# Patient Record
Sex: MALE | Race: BLACK OR AFRICAN AMERICAN | Employment: UNEMPLOYED | URBAN - METROPOLITAN AREA
[De-identification: names, ages, dates, MRNs, and addresses within clinical notes are randomized per-mention and may not be internally consistent; named-entity substitution may affect disease eponyms.]

---

## 2017-01-03 RX ORDER — AMLODIPINE BESYLATE 10 MG/1
TABLET ORAL
Qty: 30 TAB | Refills: 5 | Status: SHIPPED | OUTPATIENT
Start: 2017-01-03 | End: 2017-02-22

## 2017-01-27 DIAGNOSIS — E78.00 HYPERCHOLESTEROLEMIA: ICD-10-CM

## 2017-01-27 RX ORDER — ATORVASTATIN CALCIUM 20 MG/1
TABLET, FILM COATED ORAL
Qty: 30 TAB | Refills: 5 | Status: SHIPPED | OUTPATIENT
Start: 2017-01-27 | End: 2017-02-22 | Stop reason: SDUPTHER

## 2017-01-29 DIAGNOSIS — E11.9 DIABETES MELLITUS WITHOUT COMPLICATION (HCC): ICD-10-CM

## 2017-02-03 RX ORDER — GLIPIZIDE 5 MG/1
TABLET ORAL
Qty: 30 TAB | Refills: 5 | Status: SHIPPED | OUTPATIENT
Start: 2017-02-03 | End: 2017-08-01 | Stop reason: SDUPTHER

## 2017-02-14 ENCOUNTER — HOSPITAL ENCOUNTER (OUTPATIENT)
Dept: LAB | Age: 37
Discharge: HOME | End: 2017-02-14

## 2017-02-14 PROCEDURE — 99001 SPECIMEN HANDLING PT-LAB: CPT | Performed by: FAMILY MEDICINE

## 2017-02-22 ENCOUNTER — OFFICE VISIT (OUTPATIENT)
Dept: FAMILY MEDICINE CLINIC | Age: 37
End: 2017-02-22

## 2017-02-22 VITALS
WEIGHT: 241 LBS | OXYGEN SATURATION: 98 % | HEART RATE: 72 BPM | DIASTOLIC BLOOD PRESSURE: 68 MMHG | BODY MASS INDEX: 32.64 KG/M2 | SYSTOLIC BLOOD PRESSURE: 120 MMHG | TEMPERATURE: 98.5 F | RESPIRATION RATE: 16 BRPM | HEIGHT: 72 IN

## 2017-02-22 DIAGNOSIS — E11.9 DIABETES MELLITUS WITHOUT COMPLICATION (HCC): ICD-10-CM

## 2017-02-22 DIAGNOSIS — I10 ESSENTIAL HYPERTENSION: ICD-10-CM

## 2017-02-22 DIAGNOSIS — R35.0 URINARY FREQUENCY: ICD-10-CM

## 2017-02-22 DIAGNOSIS — E11.9 DIABETES MELLITUS WITHOUT COMPLICATION (HCC): Primary | ICD-10-CM

## 2017-02-22 DIAGNOSIS — E78.00 HYPERCHOLESTEROLEMIA: ICD-10-CM

## 2017-02-22 DIAGNOSIS — M79.89 RIGHT LEG SWELLING: ICD-10-CM

## 2017-02-22 DIAGNOSIS — F20.0 CHRONIC PARANOID SCHIZOPHRENIA (HCC): ICD-10-CM

## 2017-02-22 DIAGNOSIS — K76.0 FATTY LIVER: ICD-10-CM

## 2017-02-22 DIAGNOSIS — N18.2 CHRONIC KIDNEY DISEASE (CKD), STAGE II (MILD): ICD-10-CM

## 2017-02-22 LAB
BILIRUB UR QL STRIP: NEGATIVE
GLUCOSE UR-MCNC: NEGATIVE MG/DL
KETONES P FAST UR STRIP-MCNC: NORMAL MG/DL
PH UR STRIP: 7 [PH] (ref 4.6–8)
PROT UR QL STRIP: NEGATIVE MG/DL
SP GR UR STRIP: 1.01 (ref 1–1.03)
UA UROBILINOGEN AMB POC: NORMAL (ref 0.2–1)
URINALYSIS CLARITY POC: CLEAR
URINALYSIS COLOR POC: YELLOW
URINE BLOOD POC: NEGATIVE
URINE LEUKOCYTES POC: NEGATIVE
URINE NITRITES POC: NEGATIVE

## 2017-02-22 RX ORDER — ATORVASTATIN CALCIUM 20 MG/1
TABLET, FILM COATED ORAL
Qty: 30 TAB | Refills: 5 | Status: SHIPPED | OUTPATIENT
Start: 2017-02-22 | End: 2017-08-28 | Stop reason: SDUPTHER

## 2017-02-22 RX ORDER — LOSARTAN POTASSIUM 100 MG/1
100 TABLET ORAL DAILY
Qty: 30 TAB | Refills: 5 | Status: SHIPPED | OUTPATIENT
Start: 2017-02-22 | End: 2017-08-28 | Stop reason: SDUPTHER

## 2017-02-22 NOTE — MR AVS SNAPSHOT
Visit Information Date & Time Provider Department Dept. Phone Encounter #  
 2/22/2017  6:30 PM Jose Galeas, [address] [id number] Follow-up Instructions Return in about 1 week (around 3/1/2017) for leg swelling. Routine f/u due early Aug with labs due prior . Your Appointments 2/22/2017  6:30 PM  
ROUTINE CARE with Jose Galeas MD  
[address] [facility] ([facility]) Appt Note: routine f/u 6mo fasting labs; .; .; -; -; .  
 [address]  
[address] [facility] [address] Upcoming Health Maintenance Date Due Pneumococcal 19-64 Highest Risk (1 of 3 - PCV13) 6/28/1997 EYE EXAM RETINAL OR DILATED Q1 2/5/2016 HEMOGLOBIN A1C Q6M 8/14/2017 MICROALBUMIN Q1 2/14/2018 LIPID PANEL Q1 2/14/2018 FOOT EXAM Q1 2/22/2018 DTaP/Tdap/Td series (2 - Td) 3/16/2025 Allergies as of 2/22/2017  Review Complete On: 2/22/2017 By: Jose Galeas MD  
 No Known Allergies Current Immunizations  Reviewed on 11/30/2015 Name Date Influenza Vaccine 12/28/2014 Influenza Vaccine (Quad) PF 10/13/2016, 11/4/2015 Tdap 3/16/2015 Not reviewed this visit You Were Diagnosed With   
  
 Codes Comments Diabetes mellitus without [facility] ([facility]    -  Primary ICD-10-CM: E11.9 ICD-9-CM: 250.00 Hypercholesterolemia     ICD-10-CM: E78.00 ICD-9-CM: 272.0 Chronic paranoid schizophrenia ([facility]     ICD-10-CM: F20.0 ICD-9-CM: 295.32 Chronic kidney disease (CKD), stage II (mild)     ICD-10-CM: N18.2 ICD-9-CM: 154. 2 Fatty liver     ICD-10-CM: K76.0 ICD-9-CM: 571.8 Urinary frequency     ICD-10-CM: R35.0 ICD-9-CM: 788.41 Right leg swelling     ICD-10-CM: M79.89 ICD-9-CM: 729.81 Vitals BP  
  
  
  
  
  
 120/68 (BP 1 Location: Left arm, BP Patient Position: Sitting) Vitals History BMI and BSA Data Body Mass Index Body Surface Area  
 32.69 kg/m 2 2.36 m 2 Preferred Pharmacy Pharmacy Name Phone [facility] [facility], [address] Your Updated Medication List  
  
   
This list is accurate as of: 2/22/17  4:50 PM.  Always use your most recent med list.  
  
  
  
  
 aspirin 81 mg chewable tablet Take 81 mg by mouth daily. atorvastatin 20 mg tablet Commonly known as:  LIPITOR  
take 1 tablet by mouth once daily  
  
 benztropine 2 mg tablet Commonly known as:  COGENTIN Take 4 mg by mouth two (2) times a day. divalproex  mg tablet Commonly known as:  DEPAKOTE  
1 tab qam and 2 tabs qhs  
  
 glipiZIDE 5 mg tablet Commonly known as:  GLUCOTROL  
take 1/2 tablet by mouth twice a day  
  
 losartan 100 mg tablet Commonly known as:  COZAAR Take 1 Tab by mouth daily. traZODone 100 mg tablet Commonly known as:  Ahmadi Keo Take 100 mg by mouth nightly. Prescriptions Printed Refills  
 atorvastatin (LIPITOR) 20 mg tablet 5 Sig: take 1 tablet by mouth once daily Class: Print Prescriptions Sent to Pharmacy Refills  
 losartan (COZAAR) 100 mg tablet 5 Sig: Take 1 Tab by mouth daily. Class: Normal  
 Pharmacy: [id number], [address] [facility] Ph #: [phone] Route: Oral  
  
We Performed the Following AMB POC URINALYSIS DIP STICK AUTO W/O MICRO [28124 CPT(R)] Follow-up Instructions Return in about 1 week (around 3/1/2017) for leg swelling. Routine f/u due early Aug with labs due prior . To-Do List   
 02/22/2017 Imaging:  DUPLEX LOWER EXT VENOUS RIGHT   
  
 02/22/2017 Lab:  HEMOGLOBIN A1C W/O EAG   
  
 02/22/2017 Lab:  LIPID PANEL Patient Instructions Learning About Diabetes Food Guidelines Your Care Instructions Meal planning is important to manage diabetes.  It helps keep your blood sugar at a target level (which you set with your doctor). You don't have to eat special foods. You can eat what your family eats, including sweets once in a while. But you do have to pay attention to how often you eat and how much you eat of certain foods. You may want to work with a dietitian or a certified diabetes educator (CDE) to help you plan meals and snacks. A dietitian or CDE can also help you lose weight if that is one of your goals. What should you know about eating carbs? Managing the amount of carbohydrate (carbs) you eat is an important part of healthy meals when you have diabetes. Carbohydrate is found in many foods. · Learn which foods have carbs. And learn the amounts of carbs in different foods. ¨ Bread, cereal, pasta, and rice have about 15 grams of carbs in a serving. A serving is 1 slice of bread (1 ounce), ½ cup of cooked cereal, or 1/3 cup of cooked pasta or rice. ¨ Fruits have 15 grams of carbs in a serving. A serving is 1 small fresh fruit, such as an apple or orange; ½ of a banana; ½ cup of cooked or canned fruit; ½ cup of fruit juice; 1 cup of melon or raspberries; or 2 tablespoons of dried fruit. ¨ Milk and no-sugar-added yogurt have 15 grams of carbs in a serving. A serving is 1 cup of milk or 2/3 cup of no-sugar-added yogurt. ¨ Starchy vegetables have 15 grams of carbs in a serving. A serving is ½ cup of mashed potatoes or sweet potato; 1 cup winter squash; ½ of a small baked potato; ½ cup of cooked beans; or ½ cup cooked corn or green peas. · Learn how much carbs to eat each day and at each meal. A dietitian or CDE can teach you how to keep track of the amount of carbs you eat. This is called carbohydrate counting. · If you are not sure how to count carbohydrate grams, use the Plate Method to plan meals. It is a good, quick way to make sure that you have a balanced meal. It also helps you spread carbs throughout the day. ¨ Divide your plate by types of foods. Put non-starchy vegetables on half the plate, meat or other protein food on one-quarter of the plate, and a grain or starchy vegetable in the final quarter of the plate. To this you can add a small piece of fruit and 1 cup of milk or yogurt, depending on how many carbs you are supposed to eat at a meal. 
· Try to eat about the same amount of carbs at each meal. Do not \"save up\" your daily allowance of carbs to eat at one meal. 
· Proteins have very little or no carbs per serving. Examples of proteins are beef, chicken, turkey, fish, eggs, tofu, cheese, cottage cheese, and peanut butter. A serving size of meat is 3 ounces, which is about the size of a deck of cards. Examples of meat substitute serving sizes (equal to 1 ounce of meat) are 1/4 cup of cottage cheese, 1 egg, 1 tablespoon of peanut butter, and ½ cup of tofu. How can you eat out and still eat healthy? · Learn to estimate the serving sizes of foods that have carbohydrate. If you measure food at home, it will be easier to estimate the amount in a serving of restaurant food. · If the meal you order has too much carbohydrate (such as potatoes, corn, or baked beans), ask to have a low-carbohydrate food instead. Ask for a salad or green vegetables. · If you use insulin, check your blood sugar before and after eating out to help you plan how much to eat in the future. · If you eat more carbohydrate at a meal than you had planned, take a walk or do other exercise. This will help lower your blood sugar. What else should you know? · Limit saturated fat, such as the fat from meat and dairy products. This is a healthy choice because people who have diabetes are at higher risk of heart disease. So choose lean cuts of meat and nonfat or low-fat dairy products. Use olive or canola oil instead of butter or shortening when cooking. · Don't skip meals. Your blood sugar may drop too low if you skip meals and take insulin or certain medicines for diabetes.  
· Check with your doctor before you drink alcohol. Alcohol can cause your blood sugar to drop too low. Alcohol can also cause a bad reaction if you take certain diabetes medicines. Follow-up care is a key part of your treatment and safety. Be sure to make and go to all appointments, and call your doctor if you are having problems. It's also a good idea to know your test results and keep a list of the medicines you take. Where can you learn more? Go to http://tammie-angelica.info/. Enter F733 in the search box to learn more about \"Learning About Diabetes Food Guidelines. \" Current as of: May 23, 2016 Content Version: 11.1 © 3483-3138 [employer]. Care instructions adapted under license by [employer] (which disclaims liability or warranty for this information). If you have questions about a medical condition or this instruction, always ask your healthcare professional. [address] any warranty or liability for your use of this information. Introducing [facility]! [facility] introduces Bluelock patient portal. Now you can access parts of your medical record, email your doctor's office, and request medication refills online. 1. In your internet browser, go to [email or web address] 2. Click on the First Time User? Click Here link in the Sign In box. You will see the New Member Sign Up page. 3. Enter your Bluelock Access Code exactly as it appears below. You will not need to use this code after youve completed the sign-up process. If you do not sign up before the expiration date, you must request a new code. · Bluelock Access Code: [email or web address] Expires: 5/15/2017  9:36 AM 
 
4. Enter the last four digits of your Social Security Number (xxxx) and Date of Birth (mm/dd/yyyy) as indicated and click Submit. You will be taken to the next sign-up page. 5. Create a Bluelock ID.  This will be your Bluelock login ID and cannot be changed, so think of one that is secure and easy to remember. 6. Create a [employer] password. You can change your password at any time. 7. Enter your Password Reset Question and Answer. This can be used at a later time if you forget your password. 8. Enter your e-mail address. You will receive e-mail notification when new information is available in [address] 9. Click Sign Up. You can now view and download portions of your medical record. 10. Click the Download Summary menu link to download a portable copy of your medical information. If you have questions, please visit the Frequently Asked Questions section of the [employer] website. Remember, [employer] is NOT to be used for urgent needs. For medical emergencies, dial 911. Now available from your iPhone and Android! Please provide this summary of care documentation to your next provider. Your primary care clinician is listed as Sultana Suárez. If you have any questions after today's visit, please call [phone].

## 2017-02-22 NOTE — PATIENT INSTRUCTIONS

## 2017-02-22 NOTE — PROGRESS NOTES
1. Have you been to the ER, urgent care clinic since your last visit? Hospitalized since your last visit? No    2. Have you seen or consulted any other health care providers outside of the [address] [place] [address] since your last visit? Include any pap smears or colon screening. No     Annual eye exam: 2015 aware that he needs to have DM Eye Exam  Pneumococcal vaccine: N/A  Flu vaccine: 10-  Patient instructed to remove shoes: Yes    Mini Winter is presented at today's visit with female  by the name of Pete Fletcher. Mini Winter has given verbal permission for her to sit in routine visit.

## 2017-02-23 ENCOUNTER — HOSPITAL ENCOUNTER (OUTPATIENT)
Dept: VASCULAR SURGERY | Age: 37
Discharge: HOME | End: 2017-02-23
Attending: FAMILY MEDICINE
Payer: MEDICAID

## 2017-02-23 DIAGNOSIS — M79.89 RIGHT LEG SWELLING: ICD-10-CM

## 2017-02-23 DIAGNOSIS — I82.511 CHRONIC DEEP VEIN THROMBOSIS (DVT) OF FEMORAL VEIN OF RIGHT LOWER EXTREMITY (HCC): Primary | ICD-10-CM

## 2017-02-23 PROCEDURE — 93971 EXTREMITY STUDY: CPT

## 2017-02-23 NOTE — PROCEDURES
[facility]  *** FINAL REPORT ***    Name: Zaynab Gorman  MRN: [id number]    Outpatient  : 1980  HIS Order #: [id number]  [address] [facility] Visit #: [id number]  Date: 2017    TYPE OF TEST: Peripheral Venous Testing    REASON FOR TEST  Limb swelling    Right Leg:-  Deep venous thrombosis:           Yes  Proximal extent of thrombus:      Common Femoral  Superficial venous thrombosis:    No  Deep venous insufficiency:        Not examined  Superficial venous insufficiency: Not examined      INTERPRETATION/FINDINGS  Duplex images were obtained using 2-D gray scale, color flow, and  spectral Doppler analysis. Right leg :  1. Chronic remnant nonocclusive deep venous thrombosis identified in  the common femoral and proximal femoral veins. 2. Deep vein(s) visualized include the common femoral, proximal  femoral, mid femoral, distal femoral, popliteal(above knee),  popliteal(fossa), popliteal(below knee), posterior tibial and peroneal   veins. 3. No evidence of deep vein thrombosis in the contralateral common  femoral vein. 4. Superficial vein(s) visualized include the great saphenous vein. 5. No evidence of superficial thrombosis detected. ADDITIONAL COMMENTS  I called Dr Thurston Snare office and notified Tisam of the finding @ 9:31 am.    I have personally reviewed the data relevant to the interpretation of  this  study. TECHNOLOGIST: Prosper Quiroga, [facility], RVT/  Signed: 2017 09:31 AM    PHYSICIAN: Fred Monterroso MD  Signed: 2017 05:57 PM

## 2017-02-23 NOTE — PROGRESS NOTES
D/W patient - sent in Compression stockings to [address] [place] [address]. Discussed that the best initial treatment is to wear compression, avoid prolonged immobilization, daily ASA. I will see him for follow-up and continue to reinforce these concepts as he has forgotten all of this from his prior vascular visit a few years ago.

## 2017-02-23 NOTE — PROGRESS NOTES
SUBJECTIVE  Chief Complaint   Patient presents with    Cholesterol Problem    Diabetes    Hypertension    Urinary Frequency     Patient presents for follow-up on their diabetes and high cholesterol. He is currently not on Lipitor. His labs show an increased LDL. He has a history of fatty liver. He has no myalgias or cramping. He has been taking glipizide twice daily without any signs or symptoms of hypoglycemia. We also reviewed their cardiac risk factors. The patient is not checking home blood sugars. Denies any blurred vision. Denies any polyuria, polydipsia, or polyphagia. Denies any weight loss. He has had right leg swelling for 2 weeks now. He has a history of this in early 2016 and was seen by the ER and vascular surgery. He says he has not had pain. He had a chronic DVT at that time and was not placed on any coumadin. The note by vascular is in the chart and reviewed. He has no chest pain or dyspnea. No pleuritic pains noted. ROS:  History obtained from the patient  · General: negative for - chills, fever, weight changes or malaise  · Respiratory: no cough, shortness of breath, or wheezing  · Neurological: no tingling, headache or dizziness. · : polyuria for 2 weeks. Says he is eating more sweets. OBJECTIVE  Blood pressure 120/68, pulse 72, temperature 98.5 °F (36.9 °C), temperature source Oral, resp. rate 16, height 6' (1.829 m), weight 241 lb (109.3 kg), SpO2 98 %. General:  alert, cooperative, well appearing, in no apparent distress. CV:  The heart sounds are regular in rate and rhythm. There is a normal S1 and S2. There or no murmurs. Lungs: Inspiratory and expiratory efforts are full and unlabored. Lung sounds are clear and equal to auscultation throughout all lung fields without wheezing, rales, or rhonchi. GI:  The abdomen is soft and nontender. There is no hepatosplenomegaly or other masses. There is no rebound or guarding. Extremities:   There is swelling of the right leg and foot that is pitting. No color changes. There is dry skin but no breakdown. The feet are without lesions or ulcerations. Psych: normal affect. Mood good. Oriented x 3. Judgement and insight intact. Results for orders placed or performed in visit on 02/22/17   AMB POC URINALYSIS DIP STICK AUTO W/O MICRO   Result Value Ref Range    Color (UA POC) Yellow     Clarity (UA POC) Clear     Glucose (UA POC) Negative Negative    Bilirubin (UA POC) Negative Negative    Ketones (UA POC) Trace Negative    Specific gravity (UA POC) 1.015 1.001 - 1.035    Blood (UA POC) Negative Negative    pH (UA POC) 7.0 4.6 - 8.0    Protein (UA POC) Negative Negative mg/dL    Urobilinogen (UA POC) 1 mg/dL 0.2 - 1    Nitrites (UA POC) Negative Negative    Leukocyte esterase (UA POC) Negative Negative     ASSESSMENT / PLAN    ICD-10-CM ICD-9-CM    1. Diabetes mellitus without complication (HCC) L90.3 250.00 HEMOGLOBIN A1C W/O EAG   2. Hypercholesterolemia E78.00 272.0 atorvastatin (LIPITOR) 20 mg tablet      LIPID PANEL   3. Chronic paranoid schizophrenia (HCC) F20.0 295.32    4. Chronic kidney disease (CKD), stage II (mild) N18.2 585.2    5. Fatty liver K76.0 571.8    6. Urinary frequency R35.0 788.41 AMB POC URINALYSIS DIP STICK AUTO W/O MICRO   7. Right leg swelling M79.89 729.81 DUPLEX LOWER EXT VENOUS RIGHT      CANCELED: DUPLEX LOWER EXT VENOUS RIGHT     Reviewed labs - no hepatotoxicity. Diabetes -   Continue current care. Refills as needed. He will check his feet daily and is reminded on annual eye exams. Hypercholesterolemia - uncontrolled. Restart Lipitor 20mg nightly. Schizophrenia - continue follow-up with psychiatry. He says he is doing well without any harmful ideation. HTN with CKD - renal function is normal.  HTN is well controlled. I have changed him from Norvasc to losartan due to his right leg swelling. Fatty liver - avoid alcohol. Diet and exercise.     Urinary frequency- U/A essentially normal.  Advised on cutting back on sweets. Right leg swelling - reviewed vascular notes. I have ordered a venous doppler to re-evaluate this chronic issue with DVT. I will see him back in 1 week. He may have to see vascular again or have a hematology evaluation. He is advised on compression stockings. All chart history elements were reviewed by me at the time of the visit even though marked at time of note closure. Patient understands our medical plan. Patient has provided input and agrees with goals. Alternatives have been explained and offered. All questions answered. The patient is to call if condition worsens or fails to improve. Follow-up Disposition:  Return in about 1 week (around 3/1/2017) for leg swelling. Routine f/u due early Aug with labs due prior .

## 2017-07-10 RX ORDER — AMLODIPINE BESYLATE 10 MG/1
TABLET ORAL
Qty: 30 TAB | Refills: 5 | OUTPATIENT
Start: 2017-07-10

## 2017-07-10 NOTE — TELEPHONE ENCOUNTER
Spoke with Virginia at [employer]. He states that Amlodipine was last refilled on 6/6/17 for #30. Medication discontinued at pharmacy.

## 2017-07-24 ENCOUNTER — HOSPITAL ENCOUNTER (OUTPATIENT)
Dept: LAB | Age: 37
Discharge: HOME | End: 2017-07-24

## 2017-07-24 PROCEDURE — 99001 SPECIMEN HANDLING PT-LAB: CPT | Performed by: FAMILY MEDICINE

## 2017-07-25 LAB
CHOLEST SERPL-MCNC: 178 MG/DL (ref 100–199)
HBA1C MFR BLD: 6.2 % (ref 4.8–5.6)
HDLC SERPL-MCNC: 53 MG/DL
INTERPRETATION, 910389: NORMAL
LDLC SERPL CALC-MCNC: 97 MG/DL (ref 0–99)
TRIGL SERPL-MCNC: 139 MG/DL (ref 0–149)
VLDLC SERPL CALC-MCNC: 28 MG/DL (ref 5–40)

## 2017-08-01 DIAGNOSIS — E11.9 DIABETES MELLITUS WITHOUT COMPLICATION (HCC): ICD-10-CM

## 2017-08-01 RX ORDER — GLIPIZIDE 5 MG/1
TABLET ORAL
Qty: 30 TAB | Refills: 5 | Status: SHIPPED | OUTPATIENT
Start: 2017-08-01 | End: 2018-03-15 | Stop reason: SDUPTHER

## 2017-08-28 ENCOUNTER — OFFICE VISIT (OUTPATIENT)
Dept: FAMILY MEDICINE CLINIC | Age: 37
End: 2017-08-28

## 2017-08-28 VITALS
WEIGHT: 238 LBS | SYSTOLIC BLOOD PRESSURE: 120 MMHG | OXYGEN SATURATION: 98 % | BODY MASS INDEX: 32.23 KG/M2 | HEIGHT: 72 IN | DIASTOLIC BLOOD PRESSURE: 82 MMHG | TEMPERATURE: 98.7 F | RESPIRATION RATE: 16 BRPM | HEART RATE: 95 BPM

## 2017-08-28 DIAGNOSIS — F20.0 CHRONIC PARANOID SCHIZOPHRENIA (HCC): ICD-10-CM

## 2017-08-28 DIAGNOSIS — E78.00 HYPERCHOLESTEROLEMIA: ICD-10-CM

## 2017-08-28 DIAGNOSIS — K76.0 FATTY LIVER: ICD-10-CM

## 2017-08-28 DIAGNOSIS — E11.9 DIABETES MELLITUS WITHOUT COMPLICATION (HCC): ICD-10-CM

## 2017-08-28 DIAGNOSIS — E11.9 DIABETES MELLITUS WITHOUT COMPLICATION (HCC): Primary | ICD-10-CM

## 2017-08-28 DIAGNOSIS — M79.89 LEG SWELLING: ICD-10-CM

## 2017-08-28 DIAGNOSIS — N18.2 CHRONIC KIDNEY DISEASE (CKD), STAGE II (MILD): ICD-10-CM

## 2017-08-28 DIAGNOSIS — E66.9 OBESITY, UNSPECIFIED OBESITY SEVERITY, UNSPECIFIED OBESITY TYPE: ICD-10-CM

## 2017-08-28 DIAGNOSIS — I10 ESSENTIAL HYPERTENSION: ICD-10-CM

## 2017-08-28 RX ORDER — LOSARTAN POTASSIUM 100 MG/1
100 TABLET ORAL DAILY
Qty: 30 TAB | Refills: 5 | Status: SHIPPED | OUTPATIENT
Start: 2017-08-28 | End: 2018-03-15 | Stop reason: SDUPTHER

## 2017-08-28 RX ORDER — ATORVASTATIN CALCIUM 20 MG/1
TABLET, FILM COATED ORAL
Qty: 30 TAB | Refills: 5 | Status: SHIPPED | OUTPATIENT
Start: 2017-08-28 | End: 2018-03-15 | Stop reason: SDUPTHER

## 2017-08-28 NOTE — PATIENT INSTRUCTIONS
[facility]  Address: [address], [place], [address]  Phone: [phone]  Appointment: 9/6/17 @ 10:15 am with Dr. Fern Sullivan: Care Instructions  Your Care Instructions  A healthy lifestyle can help you feel good, stay at a healthy weight, and have plenty of energy for both work and play. A healthy lifestyle is something you can share with your whole family. A healthy lifestyle also can lower your risk for serious health problems, such as high blood pressure, heart disease, and diabetes. You can follow a few steps listed below to improve your health and the health of your family. Follow-up care is a key part of your treatment and safety. Be sure to make and go to all appointments, and call your doctor if you are having problems. Its also a good idea to know your test results and keep a list of the medicines you take. How can you care for yourself at home? · Do not eat too much sugar, fat, or fast foods. You can still have dessert and treats now and then. The goal is moderation. · Start small to improve your eating habits. Pay attention to portion sizes, drink less juice and soda pop, and eat more fruits and vegetables. ¨ Eat a healthy amount of food. A 3-ounce serving of meat, for example, is about the size of a deck of cards. Fill the rest of your plate with vegetables and whole grains. ¨ Limit the amount of soda and sports drinks you have every day. Drink more water when you are thirsty. ¨ Eat at least 5 servings of fruits and vegetables every day. It may seem like a lot, but it is not hard to reach this goal. A serving or helping is 1 piece of fruit, 1 cup of vegetables, or 2 cups of leafy, raw vegetables. Have an apple or some carrot sticks as an afternoon snack instead of a candy bar. Try to have fruits and/or vegetables at every meal.  · Make exercise part of your daily routine. You may want to start with simple activities, such as walking, bicycling, or slow swimming.  Try to be active 30 to 60 minutes every day. You do not need to do all 30 to 60 minutes all at once. For example, you can exercise 3 times a day for 10 or 20 minutes. Moderate exercise is safe for most people, but it is always a good idea to talk to your doctor before starting an exercise program.  · Keep moving. Inell Nathaniel the lawn, work in the garden, or uchoose. Take the stairs instead of the elevator at work. · If you smoke, quit. People who smoke have an increased risk for heart attack, stroke, cancer, and other lung illnesses. Quitting is hard, but there are ways to boost your chance of quitting tobacco for good. ¨ Use nicotine gum, patches, or lozenges. ¨ Ask your doctor about stop-smoking programs and medicines. ¨ Keep trying. In addition to reducing your risk of diseases in the future, you will notice some benefits soon after you stop using tobacco. If you have shortness of breath or asthma symptoms, they will likely get better within a few weeks after you quit. · Limit how much alcohol you drink. Moderate amounts of alcohol (up to 2 drinks a day for men, 1 drink a day for women) are okay. But drinking too much can lead to liver problems, high blood pressure, and other health problems. Family health  If you have a family, there are many things you can do together to improve your health. · Eat meals together as a family as often as possible. · Eat healthy foods. This includes fruits, vegetables, lean meats and dairy, and whole grains. · Include your family in your fitness plan. Most people think of activities such as jogging or tennis as the way to fitness, but there are many ways you and your family can be more active. Anything that makes you breathe hard and gets your heart pumping is exercise. Here are some tips:  ¨ Walk to do errands or to take your child to school or the bus. ¨ Go for a family bike ride after dinner instead of watching TV. Where can you learn more?   Go to http://tammie-angelica.info/. Enter N354 in the search box to learn more about \"A Healthy Lifestyle: Care Instructions. \"  Current as of: July 26, 2016  Content Version: 11.3  © 3279-0272 [employer]. Care instructions adapted under license by [employer] (which disclaims liability or warranty for this information). If you have questions about a medical condition or this instruction, always ask your healthcare professional. [place] [address] any warranty or liability for your use of this information.

## 2017-08-28 NOTE — PROGRESS NOTES
Chief Complaint   Patient presents with    Diabetes    Cholesterol Problem    Fatty Liver    Chronic Kidney Disease    Hypertension     1. Have you been to the ER, urgent care clinic since your last visit? Hospitalized since your last visit? No    2. Have you seen or consulted any other health care providers outside of the [address] [place] [address] since your last visit? Include any pap smears or colon screening.  [facility]    Has not had an eye exam.

## 2017-08-28 NOTE — PROGRESS NOTES
SUBJECTIVE  Chief Complaint   Patient presents with    Diabetes    Cholesterol Problem    Fatty Liver    Chronic Kidney Disease    Hypertension     Patient presents for follow-up on their diabetes and high cholesterol. He is currently back to taking his Lipitor. His labs show that his LDL has gone back down as a result. He has a history of fatty liver. He has no myalgias or cramping. He has been taking glipizide twice daily without any signs or symptoms of hypoglycemia. We also reviewed their cardiac risk factors. The patient is still not checking home blood sugars. Denies any blurred vision. He is overdue for an eye exam but it is scheduled. Denies any polyuria, polydipsia, or polyphagia. Denies any weight loss. ROS:  History obtained from the patient  · General: negative for - chills, fever, weight changes or malaise  · Respiratory: no cough, shortness of breath, or wheezing  · Neurological: no tingling, headache or dizziness. · Cardiovascular:  He says his leg swelling resolved so he is not wearing compression stockings    OBJECTIVE  Blood pressure 120/82, pulse 95, temperature 98.7 °F (37.1 °C), temperature source Oral, resp. rate 16, height 6' (1.829 m), weight 238 lb (108 kg), SpO2 98 %. General:  alert, cooperative, well appearing, in no apparent distress. CV:  The heart sounds are regular in rate and rhythm. There is a normal S1 and S2. There or no murmurs. Lungs: Inspiratory and expiratory efforts are full and unlabored. Lung sounds are clear and equal to auscultation throughout all lung fields without wheezing, rales, or rhonchi. GI:  The abdomen is soft and nontender. There is no hepatosplenomegaly or other masses. There is no rebound or guarding. Extremities:  No swelling. No pedal edema. Psych: normal affect. Mood good. Oriented x 3. Judgement and insight intact.      Results for orders placed or performed in visit on 02/22/17   LIPID PANEL   Result Value Ref Range    Cholesterol, total 178 100 - 199 mg/dL    Triglyceride 139 0 - 149 mg/dL    HDL Cholesterol 53 >39 mg/dL    VLDL, calculated 28 5 - 40 mg/dL    LDL, calculated 97 0 - 99 mg/dL   HEMOGLOBIN A1C W/O EAG   Result Value Ref Range    Hemoglobin A1c 6.2 (H) 4.8 - 5.6 %   CVD REPORT   Result Value Ref Range    INTERPRETATION Note    AMB POC URINALYSIS DIP STICK AUTO W/O MICRO   Result Value Ref Range    Color (UA POC) Yellow     Clarity (UA POC) Clear     Glucose (UA POC) Negative Negative    Bilirubin (UA POC) Negative Negative    Ketones (UA POC) Trace Negative    Specific gravity (UA POC) 1.015 1.001 - 1.035    Blood (UA POC) Negative Negative    pH (UA POC) 7.0 4.6 - 8.0    Protein (UA POC) Negative Negative mg/dL    Urobilinogen (UA POC) 1 mg/dL 0.2 - 1    Nitrites (UA POC) Negative Negative    Leukocyte esterase (UA POC) Negative Negative     ASSESSMENT / PLAN    ICD-10-CM ICD-9-CM    1. Diabetes mellitus without complication (HCC) I64.7 250.00 CBC W/O DIFF      METABOLIC PANEL, COMPREHENSIVE      LIPID PANEL      HEMOGLOBIN A1C W/O EAG      MICROALBUMIN, UR, RAND W/ MICROALBUMIN/CREA RATIO   2. Essential hypertension I10 401.9 losartan (COZAAR) 100 mg tablet      CBC W/O DIFF      METABOLIC PANEL, COMPREHENSIVE      LIPID PANEL   3. Hypercholesterolemia E78.00 272.0 atorvastatin (LIPITOR) 20 mg tablet      CBC W/O DIFF      METABOLIC PANEL, COMPREHENSIVE      LIPID PANEL   4. Leg swelling M79.89 729.81    5. Chronic paranoid schizophrenia (HCC) F20.0 295.32    6. Chronic kidney disease (CKD), stage II (mild) E72.9 130.8 METABOLIC PANEL, COMPREHENSIVE   7. Fatty liver X81.9 910.7 METABOLIC PANEL, COMPREHENSIVE   8. Obesity, unspecified obesity severity, unspecified obesity type E66.9 278.00      Reviewed labs - no hepatotoxicity. Diabetes -   Continue current care. Refills as needed. He will check his feet daily and is reminded on annual eye exams.       HTN with CKD - renal function is normal.  HTN is well controlled. He will continue losartan due to his right leg swelling. Hypercholesterolemia - controlled. Cont Lipitor 20mg nightly. Schizophrenia - continue follow-up with psychiatry. He says he is doing well without any harmful ideation. Right leg swelling - resolved after Norvasc discontinued. Obesity / Fatty liver - avoid alcohol. Diet and exercise. All chart history elements were reviewed by me at the time of the visit even though marked at time of note closure. Patient understands our medical plan. Patient has provided input and agrees with goals. Alternatives have been explained and offered. All questions answered. The patient is to call if condition worsens or fails to improve. Follow-up Disposition:  Return in about 6 months (around 2/28/2018) for routine care. Fasting labs due 3-7 days .

## 2017-08-28 NOTE — MR AVS SNAPSHOT
Visit Information Date & Time Provider Department Dept. Phone Encounter #  
 8/28/2017  8:45 AM Liliana Bartholomew, [address] [id number] Follow-up Instructions Return in about 6 months (around 2/28/2018) for routine care. Fasting labs due 3-7 days . Your Appointments 8/28/2017  8:45 AM  
ROUTINE CARE with Liliana Bartholomew MD  
[facility] ([facility]) Appt Note: 6 MONTH FOLLOW-UP (RESCHEDULED FROM 08/15/2017); $0 CP, $-1.00 BAL, 08/21/2017 [address]  
[address] [facility] [address] [facility] [address] Upcoming Health Maintenance Date Due Pneumococcal 19-64 Medium Risk (1 of 1 - PPSV23) 6/28/1997 EYE EXAM RETINAL OR DILATED Q1 2/5/2016 INFLUENZA AGE 9 TO ADULT 8/1/2017 HEMOGLOBIN A1C Q6M 1/24/2018 MICROALBUMIN Q1 2/14/2018 FOOT EXAM Q1 2/22/2018 LIPID PANEL Q1 7/24/2018 DTaP/Tdap/Td series (2 - Td) 3/16/2025 Allergies as of 8/28/2017  Review Complete On: 2/22/2017 By: Liliana Bartholomew MD  
 No Known Allergies Current Immunizations  Reviewed on 11/30/2015 Name Date Influenza Vaccine 12/28/2014 Influenza Vaccine (Quad) PF 10/13/2016, 11/4/2015 Tdap 3/16/2015 Not reviewed this visit You Were Diagnosed With   
  
 Codes Comments Diabetes mellitus without complication ([facility]    -  Primary ICD-10-CM: E11.9 ICD-9-CM: 250.00 Essential hypertension     ICD-10-CM: I10 
ICD-9-CM: 401.9 Hypercholesterolemia     ICD-10-CM: E78.00 ICD-9-CM: 272.0 Leg swelling     ICD-10-CM: M79.89 ICD-9-CM: 729.81 Chronic paranoid schizophrenia ([facility]     ICD-10-CM: F20.0 ICD-9-CM: 295.32 Chronic kidney disease (CKD), stage II (mild)     ICD-10-CM: N18.2 ICD-9-CM: 769. 2 Fatty liver     ICD-10-CM: K76.0 ICD-9-CM: 571.8 Vitals  BP Pulse Temp Resp Height(growth percentile) Weight(growth percentile) 120/82 (BP 1 Location: Left arm, BP Patient Position: Sitting) 95 98.7 °F (37.1 °C) (Oral) 16 6' (1.829 m) 238 lb (108 kg) SpO2 BMI Smoking Status 98% 32.28 kg/m2 Former Smoker Vitals History BMI and BSA Data Body Mass Index Body Surface Area  
 32.28 kg/m 2 2.34 m 2 Preferred Pharmacy Pharmacy Name Phone [phone] [facility], [address] Your Updated Medication List  
  
   
This list is accurate as of: 8/28/17  8:40 AM.  Always use your most recent med list.  
  
  
  
  
 aspirin 81 mg chewable tablet Take 81 mg by mouth daily. atorvastatin 20 mg tablet Commonly known as:  LIPITOR  
take 1 tablet by mouth once daily  
  
 benztropine 2 mg tablet Commonly known as:  COGENTIN Take 4 mg by mouth two (2) times a day. Comp. Stocking,Thigh,Short,Med Misc Wear daily as needed  
  
 divalproex  mg tablet Commonly known as:  DEPAKOTE  
1 tab qam and 2 tabs qhs  
  
 glipiZIDE 5 mg tablet Commonly known as:  GLUCOTROL  
take 1/2 tablet by mouth twice a day  
  
 losartan 100 mg tablet Commonly known as:  COZAAR Take 1 Tab by mouth daily. traZODone 100 mg tablet Commonly known as:  [place] Odor Take 100 mg by mouth nightly. Prescriptions Sent to Pharmacy Refills  
 atorvastatin (LIPITOR) 20 mg tablet 5 Sig: take 1 tablet by mouth once daily Class: Normal  
 Pharmacy: [address] [place] [address], [address] [place] [address] Ph #: [phone]  
 losartan (COZAAR) 100 mg tablet 5 Sig: Take 1 Tab by mouth daily. Class: Normal  
 Pharmacy: [address] [place] [address], [address] [place] [address] Ph #: [phone] Route: Oral  
  
Follow-up Instructions Return in about 6 months (around 2/28/2018) for routine care. Fasting labs due 3-7 days . To-Do List   
 08/28/2017 Lab:  CBC W/O DIFF   
  
 08/28/2017 Lab:  HEMOGLOBIN A1C W/O EAG   
  
 08/28/2017 Lab:  LIPID PANEL   
  
 08/28/2017 Lab:  METABOLIC PANEL, COMPREHENSIVE   
  
 08/28/2017 Lab:  MICROALBUMIN, UR, RAND W/ MICROALBUMIN/CREA RATIO Patient Instructions [facility] [employer] Address: [address] [place] [address], [address] [place] [address]  Phone: [phone] Appointment: 9/6/17 @ 10:15 am with Dr. Dainel Rubio A Healthy Lifestyle: Care Instructions Your Care Instructions A healthy lifestyle can help you feel good, stay at a healthy weight, and have plenty of energy for both work and play. A healthy lifestyle is something you can share with your whole family. A healthy lifestyle also can lower your risk for serious health problems, such as high blood pressure, heart disease, and diabetes. You can follow a few steps listed below to improve your health and the health of your family. Follow-up care is a key part of your treatment and safety. Be sure to make and go to all appointments, and call your doctor if you are having problems. Its also a good idea to know your test results and keep a list of the medicines you take. How can you care for yourself at home? · Do not eat too much sugar, fat, or fast foods. You can still have dessert and treats now and then. The goal is moderation. · Start small to improve your eating habits. Pay attention to portion sizes, drink less juice and soda pop, and eat more fruits and vegetables. ¨ Eat a healthy amount of food. A 3-ounce serving of meat, for example, is about the size of a deck of cards. Fill the rest of your plate with vegetables and whole grains. ¨ Limit the amount of soda and sports drinks you have every day. Drink more water when you are thirsty. ¨ Eat at least 5 servings of fruits and vegetables every day. It may seem like a lot, but it is not hard to reach this goal. A serving or helping is 1 piece of fruit, 1 cup of vegetables, or 2 cups of leafy, raw vegetables.  Have an apple or some carrot sticks as an afternoon snack instead of a candy bar. Try to have fruits and/or vegetables at every meal. 
· Make exercise part of your daily routine. You may want to start with simple activities, such as walking, bicycling, or slow swimming. Try to be active 30 to 60 minutes every day. You do not need to do all 30 to 60 minutes all at once. For example, you can exercise 3 times a day for 10 or 20 minutes. Moderate exercise is safe for most people, but it is always a good idea to talk to your doctor before starting an exercise program. 
· Keep moving. Dk Meansr the lawn, work in the garden, or Embue. Take the stairs instead of the elevator at work. · If you smoke, quit. People who smoke have an increased risk for heart attack, stroke, cancer, and other lung illnesses. Quitting is hard, but there are ways to boost your chance of quitting tobacco for good. ¨ Use nicotine gum, patches, or lozenges. ¨ Ask your doctor about stop-smoking programs and medicines. ¨ Keep trying. In addition to reducing your risk of diseases in the future, you will notice some benefits soon after you stop using tobacco. If you have shortness of breath or asthma symptoms, they will likely get better within a few weeks after you quit. · Limit how much alcohol you drink. Moderate amounts of alcohol (up to 2 drinks a day for men, 1 drink a day for women) are okay. But drinking too much can lead to liver problems, high blood pressure, and other health problems. Family health If you have a family, there are many things you can do together to improve your health. · Eat meals together as a family as often as possible. · Eat healthy foods. This includes fruits, vegetables, lean meats and dairy, and whole grains. · Include your family in your fitness plan. Most people think of activities such as jogging or tennis as the way to fitness, but there are many ways you and your family can be more active.  Anything that makes you breathe hard and gets your heart pumping is exercise. Here are some tips: 
¨ Walk to do errands or to take your child to school or the bus. ¨ Go for a family bike ride after dinner instead of watching TV. Where can you learn more? Go to http://tammie-angelica.info/. Enter F110 in the search box to learn more about \"A Healthy Lifestyle: Care Instructions. \" Current as of: July 26, 2016 Content Version: 11.3 © 8832-4912 [employer]. Care instructions adapted under license by [employer] (which disclaims liability or warranty for this information). If you have questions about a medical condition or this instruction, always ask your healthcare professional. [place] [address] any warranty or liability for your use of this information. Introducing [facility]! [employer] introduces EQAL patient portal. Now you can access parts of your medical record, email your doctor's office, and request medication refills online. 1. In your internet browser, go to [email or web address] 2. Click on the First Time User? Click Here link in the Sign In box. You will see the New Member Sign Up page. 3. Enter your EQAL Access Code exactly as it appears below. You will not need to use this code after youve completed the sign-up process. If you do not sign up before the expiration date, you must request a new code. · EQAL Access Code: [email or web address] Expires: 10/22/2017 12:02 PM 
 
4. Enter the last four digits of your Social Security Number (xxxx) and Date of Birth (mm/dd/yyyy) as indicated and click Submit. You will be taken to the next sign-up page. 5. Create a EQAL ID. This will be your EQAL login ID and cannot be changed, so think of one that is secure and easy to remember. 6. Create a EQAL password. You can change your password at any time. 7. Enter your Password Reset Question and Answer.  This can be used at a later time if you forget your password. 8. Enter your e-mail address. You will receive e-mail notification when new information is available in [address] 9. Click Sign Up. You can now view and download portions of your medical record. 10. Click the Download Summary menu link to download a portable copy of your medical information. If you have questions, please visit the Frequently Asked Questions section of the [employer] website. Remember, [employer] is NOT to be used for urgent needs. For medical emergencies, dial 911. Now available from your iPhone and Android! Please provide this summary of care documentation to your next provider. Your primary care clinician is listed as Hailee Munoz. If you have any questions after today's visit, please call [phone].

## 2017-10-04 ENCOUNTER — CLINICAL SUPPORT (OUTPATIENT)
Dept: FAMILY MEDICINE CLINIC | Age: 37
End: 2017-10-04

## 2017-10-04 DIAGNOSIS — Z23 ENCOUNTER FOR IMMUNIZATION: Primary | ICD-10-CM

## 2017-10-04 NOTE — PROGRESS NOTES
Chief Complaint   Patient presents with    Immunization/Injection     Flu vaccine     Patient presents for flu vaccine. Consent obtained, Tolerated procedure well at right deltoid. Patient remained in office 15 minutes post vaccine. No side effects noted.      Lot #  [id number]  exp 04/30/2018  [address]: [id number]

## 2017-10-04 NOTE — MR AVS SNAPSHOT
Visit Information Date & Time Provider Department Dept. Phone Encounter #  
 10/4/2017  8:30 AM Lolis Bautista, [address] [id number] Your Appointments 3/1/2018  8:00 AM  
ROUTINE CARE with Barbara Lagnua MD  
[address] [facility] ([facility]) Appt Note: routine f/u 6mo fasting labs [address] [facility] [address] [facility] [address]  
[address] [facility] [address] [facility] [address] Upcoming Health Maintenance Date Due Pneumococcal 19-64 Medium Risk (1 of 1 - PPSV23) 6/28/1997 EYE EXAM RETINAL OR DILATED Q1 2/5/2016 HEMOGLOBIN A1C Q6M 1/24/2018 MICROALBUMIN Q1 2/14/2018 FOOT EXAM Q1 2/22/2018 LIPID PANEL Q1 7/24/2018 DTaP/Tdap/Td series (2 - Td) 3/16/2025 Allergies as of 10/4/2017  Review Complete On: 10/4/2017 By: Josh Mack, LPN Severity Noted Reaction Type Reactions Norvasc [Amlodipine]  08/28/2017    Swelling Leg swelling Current Immunizations  Reviewed on 11/30/2015 Name Date Influenza Vaccine 12/28/2014 Influenza Vaccine (Quad) PF 10/4/2017, 10/13/2016, 11/4/2015 Tdap 3/16/2015 Not reviewed this visit You Were Diagnosed With   
  
 Codes Comments Encounter for immunization    -  Primary ICD-10-CM: N47 ICD-9-CM: V03.89 Vitals Smoking Status Former Smoker Preferred Pharmacy Pharmacy Name Phone [address] [facility], [address] Your Updated Medication List  
  
   
This list is accurate as of: 10/4/17  9:18 AM.  Always use your most recent med list.  
  
  
  
  
 atorvastatin 20 mg tablet Commonly known as:  LIPITOR  
take 1 tablet by mouth once daily  
  
 benztropine 2 mg tablet Commonly known as:  COGENTIN Take 4 mg by mouth two (2) times a day. divalproex  mg tablet Commonly known as:  DEPAKOTE  
1 tab qam and 2 tabs qhs  
  
 glipiZIDE 5 mg tablet Commonly known as:  GLUCOTROL  
take 1/2 tablet by mouth twice a day  
  
 losartan 100 mg tablet Commonly known as:  COZAAR Take 1 Tab by mouth daily. traZODone 100 mg tablet Commonly known as:  [place] Bump Take 100 mg by mouth nightly. We Performed the Following INFLUENZA VIRUS VAC QUAD,SPLIT,PRESV FREE SYRINGE IM [id number] CPT(R)] Patient Instructions Influenza (Flu) Vaccine (Inactivated or Recombinant): What You Need to Know Why get vaccinated? Influenza (\"flu\") is a contagious disease that spreads around the [place] [facility] every winter, usually between October and May. Flu is caused by influenza viruses and is spread mainly by coughing, sneezing, and close contact. Anyone can get flu. Flu strikes suddenly and can last several days. Symptoms vary by age, but can include: · Fever/chills. · Sore throat. · Muscle aches. · Fatigue. · Cough. · Headache. · Runny or stuffy nose. Flu can also lead to pneumonia and blood infections, and cause diarrhea and seizures in children. If you have a medical condition, such as heart or lung disease, flu can make it worse. Flu is more dangerous for some people. Infants and young children, people 72years of age and older, pregnant women, and people with certain health conditions or a weakened immune system are at greatest risk. Each year thousands of people in the [facility] die from flu, and many more are hospitalized. Flu vaccine can: · Keep you from getting flu. · Make flu less severe if you do get it. · Keep you from spreading flu to your family and other people. Inactivated and recombinant flu vaccines A dose of flu vaccine is recommended every flu season. Children 6 months through 6years of age may need two doses during the same flu season. Everyone else needs only one dose each flu season.  
Some inactivated flu vaccines contain a very small amount of a mercury-based preservative called thimerosal. Studies have not shown thimerosal in vaccines to be harmful, but flu vaccines that do not contain thimerosal are available. There is no live flu virus in flu shots. They cannot cause the flu. There are many flu viruses, and they are always changing. Each year a new flu vaccine is made to protect against three or four viruses that are likely to cause disease in the upcoming flu season. But even when the vaccine doesn't exactly match these viruses, it may still provide some protection. Flu vaccine cannot prevent: · Flu that is caused by a virus not covered by the vaccine. · Illnesses that look like flu but are not. Some people should not get this vaccine Tell the person who is giving you the vaccine: · If you have any severe (life-threatening) allergies. If you ever had a life-threatening allergic reaction after a dose of flu vaccine, or have a severe allergy to any part of this vaccine, you may be advised not to get vaccinated. Most, but not all, types of flu vaccine contain a small amount of egg protein. · If you ever had Guillain-Barré syndrome (also called GBS) Some people with a history of GBS should not get this vaccine. This should be discussed with your doctor. · If you are not feeling well. It is usually okay to get flu vaccine when you have a mild illness, but you might be asked to come back when you feel better. Risks of a vaccine reaction With any medicine, including vaccines, there is a chance of reactions. These are usually mild and go away on their own, but serious reactions are also possible. Most people who get a flu shot do not have any problems with it. Minor problems following a flu shot include: · Soreness, redness, or swelling where the shot was given · Hoarseness · Sore, red or itchy eyes · Cough · Fever · Aches · Headache · Itching · Fatigue If these problems occur, they usually begin soon after the shot and last 1 or 2 days. More serious problems following a flu shot can include the following: · There may be a small increased risk of Guillain-Barré Syndrome (GBS) after inactivated flu vaccine. This risk has been estimated at 1 or 2 additional cases per million people vaccinated. This is much lower than the risk of severe complications from flu, which can be prevented by flu vaccine. · Brendalyn Lesch children who get the flu shot along with pneumococcal vaccine (PCV13) and/or DTaP vaccine at the same time might be slightly more likely to have a seizure caused by fever. Ask your doctor for more information. Tell your doctor if a child who is getting flu vaccine has ever had a seizure Problems that could happen after any injected vaccine: · People sometimes faint after a medical procedure, including vaccination. Sitting or lying down for about 15 minutes can help prevent fainting, and injuries caused by a fall. Tell your doctor if you feel dizzy, or have vision changes or ringing in the ears. · Some people get severe pain in the shoulder and have difficulty moving the arm where a shot was given. This happens very rarely. · Any medication can cause a severe allergic reaction. Such reactions from a vaccine are very rare, estimated at about 1 in a million doses, and would happen within a few minutes to a few hours after the vaccination. As with any medicine, there is a very remote chance of a vaccine causing a serious injury or death. The safety of vaccines is always being monitored. For more information, visit: www.cdc.gov/vaccinesafety/. What if there is a serious reaction? What should I look for? · Look for anything that concerns you, such as signs of a severe allergic reaction, very high fever, or unusual behavior. Signs of a severe allergic reaction can include hives, swelling of the face and throat, difficulty breathing, a fast heartbeat, dizziness, and weakness  usually within a few minutes to a few hours after the vaccination. What should I do? · If you think it is a severe allergic reaction or other emergency that can't wait, call 9-1-1 and get the person to the nearest hospital. Otherwise, call your doctor. · Reactions should be reported to the \"Vaccine Adverse Event Reporting System\" (VAERS). Your doctor should file this report, or you can do it yourself through the VAERS website at www.vaers. [facility].gov, or by calling 5-926.995.1600. VAERS does not give medical advice. The National Vaccine Injury Compensation Program 
The National Vaccine Injury Compensation Program (VICP) is a federal program that was created to compensate people who may have been injured by certain vaccines. Persons who believe they may have been injured by a vaccine can learn about the program and about filing a claim by calling 7-868.269.6950 or visiting the [place] website at www.[place].gov/vaccinecompensation. There is a time limit to file a claim for compensation. How can I learn more? · Ask your healthcare provider. He or she can give you the vaccine package insert or suggest other sources of information. · Call your local or state health department. · Contact the Centers for Disease Control and Prevention (CDC): 
¨ Call 8-640.893.3877 (1-800-CDC-INFO) or ¨ Visit CDC's website at www.cdc.gov/flu Vaccine Information Statement Inactivated Influenza Vaccine 8/7/2015) 42 MELODY Coates [id number] Department of [facility] and [employer] Centers for Disease Control and Prevention Many Vaccine Information Statements are available in French and other languages. See www.immunize.org/vis. Muchas hojas de información sobre vacunas están disponibles en español y en otros idiomas. Visite www.immunize.org/vis. Care instructions adapted under license by [employer] (which disclaims liability or warranty for this information).  If you have questions about a medical condition or this instruction, always ask your healthcare professional. Ramande[employer] Low disclaims any warranty or liability for your use of this information. Introducing [facility]! Anna Aleta introduces [employer] patient portal. Now you can access parts of your medical record, email your doctor's office, and request medication refills online. 1. In your internet browser, go to [email or web address] 2. Click on the First Time User? Click Here link in the Sign In box. You will see the New Member Sign Up page. 3. Enter your [employer] Access Code exactly as it appears below. You will not need to use this code after youve completed the sign-up process. If you do not sign up before the expiration date, you must request a new code. · [employer] Access Code: [email or web address] Expires: 10/22/2017 12:02 PM 
 
4. Enter the last four digits of your Social Security Number (xxxx) and Date of Birth (mm/dd/yyyy) as indicated and click Submit. You will be taken to the next sign-up page. 5. Create a [employer] ID. This will be your [employer] login ID and cannot be changed, so think of one that is secure and easy to remember. 6. Create a [employer] password. You can change your password at any time. 7. Enter your Password Reset Question and Answer. This can be used at a later time if you forget your password. 8. Enter your e-mail address. You will receive e-mail notification when new information is available in [address] 9. Click Sign Up. You can now view and download portions of your medical record. 10. Click the Download Summary menu link to download a portable copy of your medical information. If you have questions, please visit the Frequently Asked Questions section of the [employer] website. Remember, [employer] is NOT to be used for urgent needs. For medical emergencies, dial 911. Now available from your iPhone and Android! Please provide this summary of care documentation to your next provider. Your primary care clinician is listed as Lane Chou.  If you have any questions after today's visit, please call [phone].

## 2017-10-04 NOTE — PATIENT INSTRUCTIONS

## 2018-03-12 ENCOUNTER — HOSPITAL ENCOUNTER (OUTPATIENT)
Dept: LAB | Age: 38
Discharge: HOME | End: 2018-03-12

## 2018-03-12 DIAGNOSIS — N18.2 CHRONIC KIDNEY DISEASE (CKD), STAGE II (MILD): ICD-10-CM

## 2018-03-12 DIAGNOSIS — E11.9 DIABETES MELLITUS WITHOUT COMPLICATION (HCC): Primary | ICD-10-CM

## 2018-03-12 DIAGNOSIS — E78.00 HYPERCHOLESTEROLEMIA: ICD-10-CM

## 2018-03-12 DIAGNOSIS — K76.0 FATTY LIVER: ICD-10-CM

## 2018-03-12 DIAGNOSIS — I10 ESSENTIAL HYPERTENSION: ICD-10-CM

## 2018-03-12 PROCEDURE — 99001 SPECIMEN HANDLING PT-LAB: CPT | Performed by: FAMILY MEDICINE

## 2018-03-13 LAB
ALBUMIN SERPL-MCNC: 4.5 G/DL (ref 3.5–5.5)
ALBUMIN/CREAT UR: 2.9 MG/G CREAT (ref 0–30)
ALBUMIN/GLOB SERPL: 1.4 {RATIO} (ref 1.2–2.2)
ALP SERPL-CCNC: 80 IU/L (ref 39–117)
ALT SERPL-CCNC: 35 IU/L (ref 0–44)
AST SERPL-CCNC: 31 IU/L (ref 0–40)
BILIRUB SERPL-MCNC: 0.2 MG/DL (ref 0–1.2)
BUN SERPL-MCNC: 14 MG/DL (ref 6–20)
BUN/CREAT SERPL: 13 (ref 9–20)
CALCIUM SERPL-MCNC: 9.9 MG/DL (ref 8.7–10.2)
CHLORIDE SERPL-SCNC: 102 MMOL/L (ref 96–106)
CHOLEST SERPL-MCNC: 163 MG/DL (ref 100–199)
CO2 SERPL-SCNC: 25 MMOL/L (ref 18–29)
CREAT SERPL-MCNC: 1.12 MG/DL (ref 0.76–1.27)
CREAT UR-MCNC: 177.9 MG/DL
ERYTHROCYTE [DISTWIDTH] IN BLOOD BY AUTOMATED COUNT: 15.3 % (ref 12.3–15.4)
EST. AVERAGE GLUCOSE BLD GHB EST-MCNC: 131 MG/DL
GFR SERPLBLD CREATININE-BSD FMLA CKD-EPI: 82 ML/MIN/1.73
GFR SERPLBLD CREATININE-BSD FMLA CKD-EPI: 95 ML/MIN/1.73
GLOBULIN SER CALC-MCNC: 3.2 G/DL (ref 1.5–4.5)
GLUCOSE SERPL-MCNC: 80 MG/DL (ref 65–99)
HBA1C MFR BLD: 6.2 % (ref 4.8–5.6)
HCT VFR BLD AUTO: 41.3 % (ref 37.5–51)
HDLC SERPL-MCNC: 49 MG/DL
HGB BLD-MCNC: 13.6 G/DL (ref 13–17.7)
INTERPRETATION, 910389: NORMAL
LDLC SERPL CALC-MCNC: 94 MG/DL (ref 0–99)
MCH RBC QN AUTO: 27.9 PG (ref 26.6–33)
MCHC RBC AUTO-ENTMCNC: 32.9 G/DL (ref 31.5–35.7)
MCV RBC AUTO: 85 FL (ref 79–97)
MICROALBUMIN UR-MCNC: 5.1 UG/ML
PLATELET # BLD AUTO: 206 X10E3/UL (ref 150–379)
POTASSIUM SERPL-SCNC: 4.3 MMOL/L (ref 3.5–5.2)
PROT SERPL-MCNC: 7.7 G/DL (ref 6–8.5)
RBC # BLD AUTO: 4.87 X10E6/UL (ref 4.14–5.8)
SODIUM SERPL-SCNC: 143 MMOL/L (ref 134–144)
TRIGL SERPL-MCNC: 98 MG/DL (ref 0–149)
VLDLC SERPL CALC-MCNC: 20 MG/DL (ref 5–40)
WBC # BLD AUTO: 5.7 X10E3/UL (ref 3.4–10.8)

## 2018-03-15 ENCOUNTER — OFFICE VISIT (OUTPATIENT)
Dept: FAMILY MEDICINE CLINIC | Age: 38
End: 2018-03-15

## 2018-03-15 ENCOUNTER — HOSPITAL ENCOUNTER (OUTPATIENT)
Dept: LAB | Age: 38
Discharge: HOME | End: 2018-03-15

## 2018-03-15 VITALS
HEART RATE: 102 BPM | RESPIRATION RATE: 16 BRPM | HEIGHT: 72 IN | WEIGHT: 238 LBS | TEMPERATURE: 98.4 F | SYSTOLIC BLOOD PRESSURE: 118 MMHG | BODY MASS INDEX: 32.23 KG/M2 | OXYGEN SATURATION: 98 % | DIASTOLIC BLOOD PRESSURE: 72 MMHG

## 2018-03-15 DIAGNOSIS — K76.0 FATTY LIVER: ICD-10-CM

## 2018-03-15 DIAGNOSIS — N18.2 CHRONIC KIDNEY DISEASE (CKD), STAGE II (MILD): ICD-10-CM

## 2018-03-15 DIAGNOSIS — R35.1 NOCTURIA: ICD-10-CM

## 2018-03-15 DIAGNOSIS — F20.0 CHRONIC PARANOID SCHIZOPHRENIA (HCC): ICD-10-CM

## 2018-03-15 DIAGNOSIS — E11.9 DIABETES MELLITUS WITHOUT COMPLICATION (HCC): Primary | ICD-10-CM

## 2018-03-15 DIAGNOSIS — E66.9 CLASS 1 OBESITY WITH SERIOUS COMORBIDITY IN ADULT, UNSPECIFIED BMI, UNSPECIFIED OBESITY TYPE: ICD-10-CM

## 2018-03-15 DIAGNOSIS — E78.00 HYPERCHOLESTEROLEMIA: ICD-10-CM

## 2018-03-15 DIAGNOSIS — I10 ESSENTIAL HYPERTENSION: ICD-10-CM

## 2018-03-15 PROCEDURE — 99001 SPECIMEN HANDLING PT-LAB: CPT | Performed by: FAMILY MEDICINE

## 2018-03-15 RX ORDER — HALOPERIDOL 5 MG/ML
INJECTION INTRAMUSCULAR
COMMUNITY

## 2018-03-15 RX ORDER — GLIPIZIDE 5 MG/1
TABLET ORAL
Qty: 30 TAB | Refills: 5 | Status: SHIPPED | OUTPATIENT
Start: 2018-03-15 | End: 2018-09-17 | Stop reason: SDUPTHER

## 2018-03-15 RX ORDER — LOSARTAN POTASSIUM 100 MG/1
100 TABLET ORAL DAILY
Qty: 30 TAB | Refills: 5 | Status: SHIPPED | OUTPATIENT
Start: 2018-03-15 | End: 2018-09-17 | Stop reason: SDUPTHER

## 2018-03-15 RX ORDER — ATORVASTATIN CALCIUM 20 MG/1
TABLET, FILM COATED ORAL
Qty: 30 TAB | Refills: 5 | Status: SHIPPED | OUTPATIENT
Start: 2018-03-15 | End: 2018-09-17 | Stop reason: SDUPTHER

## 2018-03-15 NOTE — PROGRESS NOTES
SUBJECTIVE  Chief Complaint   Patient presents with    Bipolar    Cholesterol Problem     Fatty Liver    Chronic Kidney Disease    Diabetes     Patient presents for follow-up on their diabetes and high cholesterol. He has no complaints with respect to his meds. His psychiatrist has been changing his meds some. He is now on haldol. He notices that he has been waking up once per night over the past 2-4 weeks to urinate. He has no pain or discharge. He is compliant with his meds without side effects. He has a history of fatty liver. He has no myalgias or cramping. He has been taking glipizide twice daily without any signs or symptoms of hypoglycemia. We also reviewed their cardiac risk factors. The patient is still not checking home blood sugars. Denies any blurred vision. He is overdue for an eye exam despite us helping him schedule this repeatedly. Denies any polyuria, polydipsia, or polyphagia. Denies any weight loss. ROS:  History obtained from the patient  · General: negative for - chills, fever, weight changes or malaise  · Respiratory: no cough, shortness of breath, or wheezing  · Neurological: no tingling, headache or dizziness. OBJECTIVE  Blood pressure 118/72, pulse (!) 102, temperature 98.4 °F (36.9 °C), temperature source Oral, resp. rate 16, height 6' (1.829 m), weight 238 lb (108 kg), SpO2 98 %. General:  alert, cooperative, well appearing, in no apparent distress. CV:  The heart sounds are regular in rate and rhythm. There is a normal S1 and S2. There or no murmurs. Lungs: Inspiratory and expiratory efforts are full and unlabored. Lung sounds are clear and equal to auscultation throughout all lung fields without wheezing, rales, or rhonchi. GI:  The abdomen is soft and nontender. There is no hepatosplenomegaly or other masses. There is no rebound or guarding. Extremities:  No swelling. No pedal edema. Psych: normal affect. Mood good. Oriented x 3. Judgement and insight intact. Results for orders placed or performed in visit on 46/34/15   METABOLIC PANEL, COMPREHENSIVE   Result Value Ref Range    Glucose 80 65 - 99 mg/dL    BUN 14 6 - 20 mg/dL    Creatinine 1.12 0.76 - 1.27 mg/dL    GFR est non-AA 82 >59 mL/min/1.73    GFR est AA 95 >59 mL/min/1.73    BUN/Creatinine ratio 13 9 - 20    Sodium 143 134 - 144 mmol/L    Potassium 4.3 3.5 - 5.2 mmol/L    Chloride 102 96 - 106 mmol/L    CO2 25 18 - 29 mmol/L    Calcium 9.9 8.7 - 10.2 mg/dL    Protein, total 7.7 6.0 - 8.5 g/dL    Albumin 4.5 3.5 - 5.5 g/dL    GLOBULIN, TOTAL 3.2 1.5 - 4.5 g/dL    A-G Ratio 1.4 1.2 - 2.2    Bilirubin, total 0.2 0.0 - 1.2 mg/dL    Alk. phosphatase 80 39 - 117 IU/L    AST (SGOT) 31 0 - 40 IU/L    ALT (SGPT) 35 0 - 44 IU/L   CBC W/O DIFF   Result Value Ref Range    WBC 5.7 3.4 - 10.8 x10E3/uL    RBC 4.87 4.14 - 5.80 x10E6/uL    HGB 13.6 13.0 - 17.7 g/dL    HCT 41.3 37.5 - 51.0 %    MCV 85 79 - 97 fL    MCH 27.9 26.6 - 33.0 pg    MCHC 32.9 31.5 - 35.7 g/dL    RDW 15.3 12.3 - 15.4 %    PLATELET 376 005 - 272 x10E3/uL   LIPID PANEL   Result Value Ref Range    Cholesterol, total 163 100 - 199 mg/dL    Triglyceride 98 0 - 149 mg/dL    HDL Cholesterol 49 >39 mg/dL    VLDL, calculated 20 5 - 40 mg/dL    LDL, calculated 94 0 - 99 mg/dL   MICROALBUMIN, UR, RAND   Result Value Ref Range    Creatinine, urine 177.9 Not Estab. mg/dL    Microalbumin, urine 5.1 Not Estab. ug/mL    Microalb/Creat ratio (ug/mg creat.) 2.9 0.0 - 30.0 mg/g creat   HEMOGLOBIN A1C WITH EAG   Result Value Ref Range    Hemoglobin A1c 6.2 (H) 4.8 - 5.6 %    Estimated average glucose 131 mg/dL   CVD REPORT   Result Value Ref Range    INTERPRETATION Note      ASSESSMENT / PLAN    ICD-10-CM ICD-9-CM    1. Diabetes mellitus without complication (HCC) B06.6 250.00 REFERRAL TO OPHTHALMOLOGY      glipiZIDE (GLUCOTROL) 5 mg tablet   2. Essential hypertension I10 401.9 losartan (COZAAR) 100 mg tablet   3.  Hypercholesterolemia E78.00 272.0 atorvastatin (LIPITOR) 20 mg tablet   4. Chronic kidney disease (CKD), stage II (mild) N18.2 585.2    5. Fatty liver K76.0 571.8    6. Class 1 obesity with serious comorbidity in adult, unspecified BMI, unspecified obesity type E66.9 278.00    7. Chronic paranoid schizophrenia (HCC) F20.0 295.32    8. Nocturia R35.1 788.43 PSA, DIAGNOSTIC (PROSTATE SPECIFIC AG)      URINALYSIS W/ RFLX MICROSCOPIC     Reviewed labs - no hepatotoxicity. Diabetes -   Continue current care. Refills as needed. He will check his feet daily and is reminded on annual eye exams. We have helped him once again schedule. HTN with CKD - renal function is normal.  HTN is well controlled. He will continue losartan due to his right leg swelling. Hypercholesterolemia - controlled. Cont Lipitor 20mg nightly. Obesity / Fatty liver - avoid alcohol. Diet and exercise. Schizophrenia - continue follow-up with psychiatry. Med changes reviewed. Nocturia - check PSA and U/A. He will monitor for now. Discussed prostate exam but he wanted to hold off to see if this resolves and after labs. All chart history elements were reviewed by me at the time of the visit even though marked at time of note closure. Patient understands our medical plan. Patient has provided input and agrees with goals. Alternatives have been explained and offered. All questions answered. The patient is to call if condition worsens or fails to improve. Follow-up Disposition:  Return in about 6 months (around 9/15/2018) for routine care. A1c to be done in office.

## 2018-03-15 NOTE — MR AVS SNAPSHOT
[facility] [address] 
[phone] Patient: Daniel Farley MRN: [id number] :1980 Visit Information Date & Time Provider Department Dept. Phone Encounter #  
 3/15/2018  2:30 PM Jesus Manuel Amaya, [address] [id number] Follow-up Instructions Return in about 6 months (around 9/15/2018) for routine care. A1c to be done in office. Upcoming Health Maintenance Date Due Pneumococcal 19-64 Medium Risk (1 of 1 - PPSV23) 1997 EYE EXAM RETINAL OR DILATED Q1 2016 FOOT EXAM Q1 2018 HEMOGLOBIN A1C Q6M 2018 MICROALBUMIN Q1 3/12/2019 LIPID PANEL Q1 3/12/2019 DTaP/Tdap/Td series (2 - Td) 3/16/2025 Allergies as of 3/15/2018  Review Complete On: 3/15/2018 By: Jesus Manuel Amaya MD  
  
 Severity Noted Reaction Type Reactions Norvasc [Amlodipine]  2017    Swelling Leg swelling Current Immunizations  Reviewed on 2015 Name Date Influenza Vaccine 2014, 2014 12:00 AM  
 Influenza Vaccine (Quad) PF 10/4/2017, 10/13/2016, 2015 Tdap 3/16/2015 Not reviewed this visit You Were Diagnosed With   
  
 Codes Comments Diabetes mellitus without complication ([facility]    -  Primary ICD-10-CM: E11.9 ICD-9-CM: 250.00 Essential hypertension     ICD-10-CM: I10 
ICD-9-CM: 401.9 Hypercholesterolemia     ICD-10-CM: E78.00 ICD-9-CM: 272.0 Chronic kidney disease (CKD), stage II (mild)     ICD-10-CM: N18.2 ICD-9-CM: 886. 2 Fatty liver     ICD-10-CM: K76.0 ICD-9-CM: 571.8 Class 1 obesity with serious comorbidity in adult, unspecified BMI, unspecified obesity type     ICD-10-CM: E66.9 ICD-9-CM: 278.00 Chronic paranoid schizophrenia ([facility]     ICD-10-CM: F20.0 ICD-9-CM: 295.32 Sarcoidosis     ICD-10-CM: D86.9 ICD-9-CM: 135 Nocturia     ICD-10-CM: R35.1 ICD-9-CM: 788.43 Vitals BP Pulse Temp Resp Height(growth percentile) Weight(growth percentile) 118/72 (BP 1 Location: Left arm, BP Patient Position: Sitting) (!) 102 98.4 °F (36.9 °C) (Oral) 16 6' (1.829 m) 238 lb (108 kg) SpO2 BMI Smoking Status 98% 32.28 kg/m2 Former Smoker Vitals History BMI and BSA Data Body Mass Index Body Surface Area  
 32.28 kg/m 2 2.34 m 2 Preferred Pharmacy Pharmacy Name Phone [address] [facility], [address] [place], [place] [address] Your Updated Medication List  
  
   
This list is accurate as of 3/15/18  3:10 PM.  Always use your most recent med list.  
  
  
  
  
 atorvastatin 20 mg tablet Commonly known as:  LIPITOR  
take 1 tablet by mouth once daily  
  
 benztropine 2 mg tablet Commonly known as:  COGENTIN Take 4 mg by mouth two (2) times a day. divalproex  mg tablet Commonly known as:  DEPAKOTE  
1 tab qam and 2 tabs qhs  
  
 glipiZIDE 5 mg tablet Commonly known as:  GLUCOTROL  
take 1/2 tablet by mouth twice a day HALDOL 5 mg/mL injection Generic drug:  haloperidol lactate  
by IntraMUSCular route once. losartan 100 mg tablet Commonly known as:  COZAAR Take 1 Tab by mouth daily. Prescriptions Printed Refills  
 atorvastatin (LIPITOR) 20 mg tablet 5 Sig: take 1 tablet by mouth once daily Class: Print  
 losartan (COZAAR) 100 mg tablet 5 Sig: Take 1 Tab by mouth daily. Class: Print Route: Oral  
 glipiZIDE (GLUCOTROL) 5 mg tablet 5 Sig: take 1/2 tablet by mouth twice a day Class: Print We Performed the Following REFERRAL TO OPHTHALMOLOGY [REF57 Custom] Comments:  
 Please evaluate patient for diabetic eye exam  
  
Follow-up Instructions Return in about 6 months (around 9/15/2018) for routine care. A1c to be done in office. To-Do List   
 03/15/2018 Lab:  PSA, DIAGNOSTIC (PROSTATE SPECIFIC AG)   
  
 03/15/2018   Lab: URINALYSIS W/ RFLX MICROSCOPIC Referral Information Referral ID Referred By Referred To  
  
 [id number] Mandeep Garcia MD   
   [facility], [address] Keturah Lee, [address] Phone: [phone] Fax: [phone] Visits Status Start Date End Date 1 New Request 3/15/18 3/15/19 If your referral has a status of pending review or denied, additional information will be sent to support the outcome of this decision. Patient Instructions A Healthy Lifestyle: Care Instructions Your Care Instructions A healthy lifestyle can help you feel good, stay at a healthy weight, and have plenty of energy for both work and play. A healthy lifestyle is something you can share with your whole family. A healthy lifestyle also can lower your risk for serious health problems, such as high blood pressure, heart disease, and diabetes. You can follow a few steps listed below to improve your health and the health of your family. Follow-up care is a key part of your treatment and safety. Be sure to make and go to all appointments, and call your doctor if you are having problems. It's also a good idea to know your test results and keep a list of the medicines you take. How can you care for yourself at home? · Do not eat too much sugar, fat, or fast foods. You can still have dessert and treats now and then. The goal is moderation. · Start small to improve your eating habits. Pay attention to portion sizes, drink less juice and soda pop, and eat more fruits and vegetables. ¨ Eat a healthy amount of food. A 3-ounce serving of meat, for example, is about the size of a deck of cards. Fill the rest of your plate with vegetables and whole grains. ¨ Limit the amount of soda and sports drinks you have every day. Drink more water when you are thirsty. ¨ Eat at least 5 servings of fruits and vegetables every day.  It may seem like a lot, but it is not hard to reach this goal. A serving or helping is 1 piece of fruit, 1 cup of vegetables, or 2 cups of leafy, raw vegetables. Have an apple or some carrot sticks as an afternoon snack instead of a candy bar. Try to have fruits and/or vegetables at every meal. 
· Make exercise part of your daily routine. You may want to start with simple activities, such as walking, bicycling, or slow swimming. Try to be active 30 to 60 minutes every day. You do not need to do all 30 to 60 minutes all at once. For example, you can exercise 3 times a day for 10 or 20 minutes. Moderate exercise is safe for most people, but it is always a good idea to talk to your doctor before starting an exercise program. 
· Keep moving. [place] the lawn, work in the garden, or InGaugeIt. Take the stairs instead of the elevator at work. · If you smoke, quit. People who smoke have an increased risk for heart attack, stroke, cancer, and other lung illnesses. Quitting is hard, but there are ways to boost your chance of quitting tobacco for good. ¨ Use nicotine gum, patches, or lozenges. ¨ Ask your doctor about stop-smoking programs and medicines. ¨ Keep trying. In addition to reducing your risk of diseases in the future, you will notice some benefits soon after you stop using tobacco. If you have shortness of breath or asthma symptoms, they will likely get better within a few weeks after you quit. · Limit how much alcohol you drink. Moderate amounts of alcohol (up to 2 drinks a day for men, 1 drink a day for women) are okay. But drinking too much can lead to liver problems, high blood pressure, and other health problems. Family health If you have a family, there are many things you can do together to improve your health. · Eat meals together as a family as often as possible. · Eat healthy foods. This includes fruits, vegetables, lean meats and dairy, and whole grains. · Include your family in your fitness plan.  Most people think of activities such as jogging or tennis as the way to fitness, but there are many ways you and your family can be more active. Anything that makes you breathe hard and gets your heart pumping is exercise. Here are some tips: 
¨ Walk to do errands or to take your child to school or the bus. ¨ Go for a family bike ride after dinner instead of watching TV. Where can you learn more? Go to http://tammie-angelica.info/. Enter L086 in the search box to learn more about \"A Healthy Lifestyle: Care Instructions. \" Current as of: May 12, 2017 Content Version: 11.4 © 4091-4806 ibeatyou. Care instructions adapted under license by [employer]eebel (which disclaims liability or warranty for this information). If you have questions about a medical condition or this instruction, always ask your healthcare professional. [address] any warranty or liability for your use of this information. [facility], [address] Phone: [phone] Appointment: 3/20/18 @ 1:00 pm with Dr. Elaine Henriquez Introducing [facility] & HEALTH SERVICES! Maisha Mclaughlin introduces BIBA Apparels patient portal. Now you can access parts of your medical record, email your doctor's office, and request medication refills online. 1. In your internet browser, go to [email or web address] 2. Click on the First Time User? Click Here link in the Sign In box. You will see the New Member Sign Up page. 3. Enter your BIBA Apparels Access Code exactly as it appears below. You will not need to use this code after youve completed the sign-up process. If you do not sign up before the expiration date, you must request a new code. · BIB[employer] Apparels Access Code: [email or web address] Expires: 6/10/2018 10:25 AM 
 
4. Enter the last four digits of your Social Security Number (xxxx) and Date of Birth (mm/dd/yyyy) as indicated and click Submit. You will be taken to the next sign-up page. 5. Create a BIBA Apparels ID.  This will be your Miiix login ID and cannot be changed, so think of one that is secure and easy to remember. 6. Create a Miiix password. You can change your password at any time. 7. Enter your Password Reset Question and Answer. This can be used at a later time if you forget your password. 8. Enter your e-mail address. You will receive e-mail notification when new information is available in [address] 9. Click Sign Up. You can now view and download portions of your medical record. 10. Click the Download Summary menu link to download a portable copy of your medical information. If you have questions, please visit the Frequently Asked Questions section of the Miiix website. Remember, Miiix is NOT to be used for urgent needs. For medical emergencies, dial 911. Now available from your iPhone and Android! Please provide this summary of care documentation to your next provider. Your primary care clinician is listed as Amy Queen. If you have any questions after today's visit, please call [phone].

## 2018-03-15 NOTE — PATIENT INSTRUCTIONS

## 2018-03-15 NOTE — PROGRESS NOTES
1. Have you been to the ER, urgent care clinic since your last visit? Hospitalized since your last visit? No    2. Have you seen or consulted any other health care providers outside of the [facility] since your last visit? Include any pap smears or colon screening. No     Annual eye exam: 02-  Pneumococcal vaccine: N/A  Flu vaccine: 10-  Patient instructed to remove shoes:  Yes

## 2018-03-16 LAB
APPEARANCE UR: ABNORMAL
BILIRUB UR QL STRIP: NEGATIVE
COLOR UR: YELLOW
GLUCOSE UR QL: NEGATIVE
HGB UR QL STRIP: NEGATIVE
KETONES UR QL STRIP: NEGATIVE
LEUKOCYTE ESTERASE UR QL STRIP: NEGATIVE
MICRO URNS: ABNORMAL
NITRITE UR QL STRIP: NEGATIVE
PH UR STRIP: 5.5 [PH] (ref 5–7.5)
PROT UR QL STRIP: NEGATIVE
PSA SERPL-MCNC: 0.6 NG/ML (ref 0–4)
SP GR UR: 1.03 (ref 1–1.03)
UROBILINOGEN UR STRIP-MCNC: 0.2 MG/DL (ref 0.2–1)

## 2018-08-10 ENCOUNTER — HOSPITAL ENCOUNTER (OUTPATIENT)
Dept: LAB | Age: 38
Discharge: HOME | End: 2018-08-10

## 2018-08-10 PROCEDURE — 99001 SPECIMEN HANDLING PT-LAB: CPT | Performed by: FAMILY MEDICINE

## 2018-08-11 LAB
APPEARANCE UR: CLEAR
BILIRUB UR QL STRIP: NEGATIVE
COLOR UR: YELLOW
GLUCOSE UR QL: NEGATIVE
HGB UR QL STRIP: NEGATIVE
KETONES UR QL STRIP: NEGATIVE
LEUKOCYTE ESTERASE UR QL STRIP: NEGATIVE
MICRO URNS: NORMAL
NITRITE UR QL STRIP: NEGATIVE
PH UR STRIP: 6 [PH] (ref 5–7.5)
PROT UR QL STRIP: NEGATIVE
PSA SERPL-MCNC: 0.6 NG/ML (ref 0–4)
SP GR UR: 1.01 (ref 1–1.03)
UROBILINOGEN UR STRIP-MCNC: 0.2 MG/DL (ref 0.2–1)

## 2018-08-23 ENCOUNTER — TELEPHONE (OUTPATIENT)
Dept: FAMILY MEDICINE CLINIC | Age: 38
End: 2018-08-23

## 2018-08-23 NOTE — TELEPHONE ENCOUNTER
Spoke with Jane White, states he has had a cold for 1-2 weeks now.  He would like to know what over the counter medication he can take along with his medication that he is prescribed

## 2018-08-23 NOTE — TELEPHONE ENCOUNTER
Pt states that he has a cold and would like to know what he can take along with the medications that he is on. Please advise.

## 2018-08-23 NOTE — TELEPHONE ENCOUNTER
Spoke with Jen Vyas to inform him that per Dr. Thurston Snare he  can start over the counter Robitussin DM for now and if he is not improving, he can see me next week to see if this may be allergies.  Baldomero voice understanding

## 2018-08-23 NOTE — TELEPHONE ENCOUNTER
Right now allergies are very common so it may not necessarily be a cold. He can start over the counter Robitussin DM for now and if he is not improving, he can see me next week to see if this may be allergies.

## 2018-09-10 ENCOUNTER — HOSPITAL ENCOUNTER (OUTPATIENT)
Dept: LAB | Age: 38
Discharge: HOME | End: 2018-09-10

## 2018-09-10 LAB — XX-LABCORP SPECIMEN COL,LCBCF: NORMAL

## 2018-09-10 PROCEDURE — 99001 SPECIMEN HANDLING PT-LAB: CPT | Performed by: FAMILY MEDICINE

## 2018-09-11 LAB
ALBUMIN SERPL-MCNC: 4.3 G/DL (ref 3.5–5.5)
ALBUMIN/CREAT UR: <1.3 MG/G CREAT (ref 0–30)
ALBUMIN/GLOB SERPL: 1.4 {RATIO} (ref 1.2–2.2)
ALP SERPL-CCNC: 69 IU/L (ref 39–117)
ALT SERPL-CCNC: 22 IU/L (ref 0–44)
AST SERPL-CCNC: 23 IU/L (ref 0–40)
BILIRUB SERPL-MCNC: 0.3 MG/DL (ref 0–1.2)
BUN SERPL-MCNC: 14 MG/DL (ref 6–24)
BUN/CREAT SERPL: 11 (ref 9–20)
CALCIUM SERPL-MCNC: 9.8 MG/DL (ref 8.7–10.2)
CHLORIDE SERPL-SCNC: 103 MMOL/L (ref 96–106)
CHOLEST SERPL-MCNC: 174 MG/DL (ref 100–199)
CO2 SERPL-SCNC: 24 MMOL/L (ref 20–29)
CREAT SERPL-MCNC: 1.31 MG/DL (ref 0.76–1.27)
CREAT UR-MCNC: 233.7 MG/DL
ERYTHROCYTE [DISTWIDTH] IN BLOOD BY AUTOMATED COUNT: 15.2 % (ref 12.3–15.4)
EST. AVERAGE GLUCOSE BLD GHB EST-MCNC: 126 MG/DL
GLOBULIN SER CALC-MCNC: 3.1 G/DL (ref 1.5–4.5)
GLUCOSE SERPL-MCNC: 87 MG/DL (ref 65–99)
HBA1C MFR BLD: 6 % (ref 4.8–5.6)
HCT VFR BLD AUTO: 39.5 % (ref 37.5–51)
HDLC SERPL-MCNC: 55 MG/DL
HGB BLD-MCNC: 12.7 G/DL (ref 13–17.7)
INTERPRETATION, 910389: NORMAL
LDLC SERPL CALC-MCNC: 92 MG/DL (ref 0–99)
Lab: NORMAL
MCH RBC QN AUTO: 28 PG (ref 26.6–33)
MCHC RBC AUTO-ENTMCNC: 32.2 G/DL (ref 31.5–35.7)
MCV RBC AUTO: 87 FL (ref 79–97)
MICROALBUMIN UR-MCNC: <3 UG/ML
PLATELET # BLD AUTO: 223 X10E3/UL (ref 150–379)
POTASSIUM SERPL-SCNC: 4.2 MMOL/L (ref 3.5–5.2)
PROT SERPL-MCNC: 7.4 G/DL (ref 6–8.5)
RBC # BLD AUTO: 4.54 X10E6/UL (ref 4.14–5.8)
SODIUM SERPL-SCNC: 144 MMOL/L (ref 134–144)
TRIGL SERPL-MCNC: 136 MG/DL (ref 0–149)
VLDLC SERPL CALC-MCNC: 27 MG/DL (ref 5–40)
WBC # BLD AUTO: 5 X10E3/UL (ref 3.4–10.8)

## 2018-09-17 ENCOUNTER — OFFICE VISIT (OUTPATIENT)
Dept: FAMILY MEDICINE CLINIC | Age: 38
End: 2018-09-17

## 2018-09-17 VITALS
BODY MASS INDEX: 34.67 KG/M2 | TEMPERATURE: 98 F | HEART RATE: 73 BPM | HEIGHT: 72 IN | OXYGEN SATURATION: 98 % | RESPIRATION RATE: 16 BRPM | SYSTOLIC BLOOD PRESSURE: 122 MMHG | WEIGHT: 256 LBS | DIASTOLIC BLOOD PRESSURE: 78 MMHG

## 2018-09-17 DIAGNOSIS — E78.00 HYPERCHOLESTEROLEMIA: ICD-10-CM

## 2018-09-17 DIAGNOSIS — Z23 ENCOUNTER FOR IMMUNIZATION: ICD-10-CM

## 2018-09-17 DIAGNOSIS — I10 ESSENTIAL HYPERTENSION: ICD-10-CM

## 2018-09-17 DIAGNOSIS — F20.0 CHRONIC PARANOID SCHIZOPHRENIA (HCC): ICD-10-CM

## 2018-09-17 DIAGNOSIS — N18.2 CHRONIC KIDNEY DISEASE (CKD), STAGE II (MILD): ICD-10-CM

## 2018-09-17 DIAGNOSIS — E11.9 DIABETES MELLITUS WITHOUT COMPLICATION (HCC): Primary | ICD-10-CM

## 2018-09-17 DIAGNOSIS — E66.9 CLASS 1 OBESITY WITH SERIOUS COMORBIDITY IN ADULT, UNSPECIFIED BMI, UNSPECIFIED OBESITY TYPE: ICD-10-CM

## 2018-09-17 DIAGNOSIS — K76.0 FATTY LIVER: ICD-10-CM

## 2018-09-17 RX ORDER — LOSARTAN POTASSIUM 100 MG/1
100 TABLET ORAL DAILY
Qty: 30 TAB | Refills: 5 | Status: SHIPPED | OUTPATIENT
Start: 2018-09-17 | End: 2018-09-30 | Stop reason: SDUPTHER

## 2018-09-17 RX ORDER — ATORVASTATIN CALCIUM 20 MG/1
TABLET, FILM COATED ORAL
Qty: 30 TAB | Refills: 5 | Status: SHIPPED | OUTPATIENT
Start: 2018-09-17 | End: 2019-03-28 | Stop reason: SDUPTHER

## 2018-09-17 RX ORDER — GLIPIZIDE 5 MG/1
TABLET ORAL
Qty: 30 TAB | Refills: 5 | Status: SHIPPED | OUTPATIENT
Start: 2018-09-17 | End: 2018-09-30 | Stop reason: SDUPTHER

## 2018-09-17 NOTE — MR AVS SNAPSHOT
[facility] [address] [place] [address] 
[phone] Patient: Amalia Persaud MRN: [id number] :1980 Visit Information Date & Time Provider Department Dept. Phone Encounter #  
 2018 10:30 AM Michelle Manriquez, [address] [facility] [id number] Follow-up Instructions Return in about 6 months (around 3/17/2019) for  routine care and POC A1C in office. Please discuss with  dental insurance concerns. Upcoming Health Maintenance Date Due Pneumococcal 19-64 Medium Risk (1 of 1 - PPSV23) 1997 HEMOGLOBIN A1C Q6M 3/10/2019 FOOT EXAM Q1 3/15/2019 EYE EXAM RETINAL OR DILATED Q1 3/21/2019 MICROALBUMIN Q1 9/10/2019 LIPID PANEL Q1 9/10/2019 DTaP/Tdap/Td series (2 - Td) 3/16/2025 Allergies as of 2018  Review Complete On: 2018 By: Michelle Manriquez MD  
  
 Severity Noted Reaction Type Reactions Norvasc [Amlodipine]  2017    Swelling Leg swelling Current Immunizations  Reviewed on 2018 Name Date Influenza Vaccine 2014, 2014 12:00 AM  
 Influenza Vaccine (Quad) PF 2018, 10/4/2017, 10/13/2016, 2015 Tdap 3/16/2015 Reviewed by Shayla Ngo on 2018 at 10:11 AM  
You Were Diagnosed With   
  
 Codes Comments Diabetes mellitus without complication ([facility]    -  Primary ICD-10-CM: E11.9 ICD-9-CM: 250.00 Chronic kidney disease (CKD), stage II (mild)     ICD-10-CM: N18.2 ICD-9-CM: 585.2 Essential hypertension     ICD-10-CM: I10 
ICD-9-CM: 401.9 Hypercholesterolemia     ICD-10-CM: E78.00 ICD-9-CM: 272.0 Fatty liver     ICD-10-CM: K76.0 ICD-9-CM: 571.8 Chronic paranoid schizophrenia ([facility]     ICD-10-CM: F20.0 ICD-9-CM: 295.32 Class 1 obesity with serious comorbidity in adult, unspecified BMI, unspecified obesity type     ICD-10-CM: E66.9 ICD-9-CM: 278.00  Encounter for immunization     ICD-10-CM: Z22 ICD-9-CM: V03.89 Vitals BP Pulse Temp Resp Height(growth percentile) Weight(growth percentile) 122/78 (BP 1 Location: Left arm, BP Patient Position: Sitting) 73 98 °F (36.7 °C) (Oral) 16 6' (1.829 m) 256 lb (116.1 kg) SpO2 BMI Smoking Status 98% 34.72 kg/m2 Former Smoker Vitals History BMI and BSA Data Body Mass Index Body Surface Area 34.72 kg/m 2 2.43 m 2 Preferred Pharmacy Pharmacy Name Phone [facility] [facility], [address] Your Updated Medication List  
  
   
This list is accurate as of 9/17/18 10:44 AM.  Always use your most recent med list.  
  
  
  
  
 atorvastatin 20 mg tablet Commonly known as:  LIPITOR  
take 1 tablet by mouth once daily  
  
 benztropine 2 mg tablet Commonly known as:  COGENTIN Take 4 mg by mouth two (2) times a day. divalproex  mg tablet Commonly known as:  DEPAKOTE  
1 tab qam and 2 tabs qhs  
  
 glipiZIDE 5 mg tablet Commonly known as:  GLUCOTROL  
take 1/2 tablet by mouth twice a day HALDOL 5 mg/mL injection Generic drug:  haloperidol lactate  
by IntraMUSCular route once. losartan 100 mg tablet Commonly known as:  COZAAR Take 1 Tab by mouth daily. Prescriptions Printed Refills  
 atorvastatin (LIPITOR) 20 mg tablet 5 Sig: take 1 tablet by mouth once daily Class: Print  
 losartan (COZAAR) 100 mg tablet 5 Sig: Take 1 Tab by mouth daily. Class: Print Route: Oral  
 glipiZIDE (GLUCOTROL) 5 mg tablet 5 Sig: take 1/2 tablet by mouth twice a day Class: Print We Performed the Following INFLUENZA VIRUS VAC QUAD,SPLIT,PRESV FREE SYRINGE IM [id number] CPT(R)] LA IMMUNIZ ADMIN,1 SINGLE/COMB VAC/TOXOID [id number] CPT(R)] REFERRAL TO OPTOMETRY [id number] Custom] Follow-up Instructions  Return in about 6 months (around 3/17/2019) for  routine care and POC A1C in office. Please discuss with  dental insurance concerns. Referral Information Referral ID Referred By Referred To  
  
 [id number] Daniel Melendez, SANTHOSH   
   [address] [facility] [place], [place]  Phone: [phone] Fax: [phone] Visits Status Start Date End Date 1 New Request 9/17/18 9/17/19 If your referral has a status of pending review or denied, additional information will be sent to support the outcome of this decision. Patient Instructions A Healthy Lifestyle: Care Instructions Your Care Instructions A healthy lifestyle can help you feel good, stay at a healthy weight, and have plenty of energy for both work and play. A healthy lifestyle is something you can share with your whole family. A healthy lifestyle also can lower your risk for serious health problems, such as high blood pressure, heart disease, and diabetes. You can follow a few steps listed below to improve your health and the health of your family. Follow-up care is a key part of your treatment and safety. Be sure to make and go to all appointments, and call your doctor if you are having problems. It's also a good idea to know your test results and keep a list of the medicines you take. How can you care for yourself at home? · Do not eat too much sugar, fat, or fast foods. You can still have dessert and treats now and then. The goal is moderation. · Start small to improve your eating habits. Pay attention to portion sizes, drink less juice and soda pop, and eat more fruits and vegetables. ¨ Eat a healthy amount of food. A 3-ounce serving of meat, for example, is about the size of a deck of cards. Fill the rest of your plate with vegetables and whole grains. ¨ Limit the amount of soda and sports drinks you have every day. Drink more water when you are thirsty. ¨ Eat at least 5 servings of fruits and vegetables every day.  It may seem like a lot, but it is not hard to reach this goal. A serving or helping is 1 piece of fruit, 1 cup of vegetables, or 2 cups of leafy, raw vegetables. Have an apple or some carrot sticks as an afternoon snack instead of a candy bar. Try to have fruits and/or vegetables at every meal. 
· Make exercise part of your daily routine. You may want to start with simple activities, such as walking, bicycling, or slow swimming. Try to be active 30 to 60 minutes every day. You do not need to do all 30 to 60 minutes all at once. For example, you can exercise 3 times a day for 10 or 20 minutes. Moderate exercise is safe for most people, but it is always a good idea to talk to your doctor before starting an exercise program. 
· Keep moving. Sylvia Bonds the lawn, work in the garden, or PeopleGoal. Take the stairs instead of the elevator at work. · If you smoke, quit. People who smoke have an increased risk for heart attack, stroke, cancer, and other lung illnesses. Quitting is hard, but there are ways to boost your chance of quitting tobacco for good. ¨ Use nicotine gum, patches, or lozenges. ¨ Ask your doctor about stop-smoking programs and medicines. ¨ Keep trying. In addition to reducing your risk of diseases in the future, you will notice some benefits soon after you stop using tobacco. If you have shortness of breath or asthma symptoms, they will likely get better within a few weeks after you quit. · Limit how much alcohol you drink. Moderate amounts of alcohol (up to 2 drinks a day for men, 1 drink a day for women) are okay. But drinking too much can lead to liver problems, high blood pressure, and other health problems. Family health If you have a family, there are many things you can do together to improve your health. · Eat meals together as a family as often as possible. · Eat healthy foods. This includes fruits, vegetables, lean meats and dairy, and whole grains. · Include your family in your fitness plan.  Most people think of activities such as jogging or tennis as the way to fitness, but there are many ways you and your family can be more active. Anything that makes you breathe hard and gets your heart pumping is exercise. Here are some tips: 
¨ Walk to do errands or to take your child to school or the bus. ¨ Go for a family bike ride after dinner instead of watching TV. Where can you learn more? Go to http://tammie-angelica.info/. Enter S729 in the search box to learn more about \"A Healthy Lifestyle: Care Instructions. \" Current as of: December 7, 2017 Content Version: 11.7 © 3681-7438 PhoneAndPhone. Care instructions adapted under license by [employer] (which disclaims liability or warranty for this information). If you have questions about a medical condition or this instruction, always ask your healthcare professional. [address] any warranty or liability for your use of this information. Follow up in 6 months for routine care and POC A1C in office. Please discuss with  dental insurance concerns Introducing [facility] & HEALTH SERVICES! [facility] introduces Buytech patient portal. Now you can access parts of your medical record, email your doctor's office, and request medication refills online. 1. In your internet browser, go to [email or web address] 2. Click on the First Time User? Click Here link in the Sign In box. You will see the New Member Sign Up page. 3. Enter your Buytech Access Code exactly as it appears below. You will not need to use this code after youve completed the sign-up process. If you do not sign up before the expiration date, you must request a new code. · Buytech Access Code: [email or web address] Expires: 12/16/2018 10:44 AM 
 
4. Enter the last four digits of your Social Security Number (xxxx) and Date of Birth (mm/dd/yyyy) as indicated and click Submit. You will be taken to the next sign-up page.  
5. Create a LDL Technologyt ID. This will be your What's Hot login ID and cannot be changed, so think of one that is secure and easy to remember. 6. Create a What's Hot password. You can change your password at any time. 7. Enter your Password Reset Question and Answer. This can be used at a later time if you forget your password. 8. Enter your e-mail address. You will receive e-mail notification when new information is available in [address] 9. Click Sign Up. You can now view and download portions of your medical record. 10. Click the Download Summary menu link to download a portable copy of your medical information. If you have questions, please visit the Frequently Asked Questions section of the What's Hot website. Remember, What's Hot is NOT to be used for urgent needs. For medical emergencies, dial 911. Now available from your iPhone and Android! Please provide this summary of care documentation to your next provider. Your primary care clinician is listed as Derik Navarro. If you have any questions after today's visit, please call [phone].

## 2018-09-17 NOTE — PATIENT INSTRUCTIONS

## 2018-09-17 NOTE — PROGRESS NOTES
Physician order obtained. Patient completed adult immunization consent form. Allergies, contraindications and recommendations reviewed with patient. Seasonal influenza vaccine administered IM right deltoid. Patient tolerated well. Patient remained in office for 15 minutes after injection and no adverse reactions were noted.

## 2018-09-17 NOTE — PROGRESS NOTES
1. Have you been to the ER, urgent care clinic since your last visit? Hospitalized since your last visit? No    2. Have you seen or consulted any other health care providers outside of the [address] [place] [address] since your last visit? Include any pap smears or colon screening.  No

## 2018-09-30 DIAGNOSIS — I10 ESSENTIAL HYPERTENSION: ICD-10-CM

## 2018-09-30 DIAGNOSIS — E11.9 DIABETES MELLITUS WITHOUT COMPLICATION (HCC): ICD-10-CM

## 2018-10-04 RX ORDER — GLIPIZIDE 5 MG/1
TABLET ORAL
Qty: 30 TAB | Refills: 5 | Status: SHIPPED | OUTPATIENT
Start: 2018-10-04 | End: 2019-03-28 | Stop reason: SDUPTHER

## 2018-10-04 RX ORDER — LOSARTAN POTASSIUM 100 MG/1
TABLET ORAL
Qty: 30 TAB | Refills: 5 | Status: SHIPPED | OUTPATIENT
Start: 2018-10-04 | End: 2019-03-28 | Stop reason: SDUPTHER

## 2019-01-09 LAB
CREATININE, EXTERNAL: 1.15
HBA1C MFR BLD HPLC: 6 %
LDL-C, EXTERNAL: 79

## 2019-03-28 ENCOUNTER — OFFICE VISIT (OUTPATIENT)
Dept: FAMILY MEDICINE CLINIC | Age: 39
End: 2019-03-28

## 2019-03-28 VITALS
DIASTOLIC BLOOD PRESSURE: 76 MMHG | HEART RATE: 88 BPM | RESPIRATION RATE: 16 BRPM | SYSTOLIC BLOOD PRESSURE: 120 MMHG | TEMPERATURE: 98.2 F | HEIGHT: 72 IN | BODY MASS INDEX: 33.59 KG/M2 | WEIGHT: 248 LBS | OXYGEN SATURATION: 98 %

## 2019-03-28 DIAGNOSIS — F20.0 CHRONIC PARANOID SCHIZOPHRENIA (HCC): ICD-10-CM

## 2019-03-28 DIAGNOSIS — E11.9 DIABETES MELLITUS WITHOUT COMPLICATION (HCC): Primary | ICD-10-CM

## 2019-03-28 DIAGNOSIS — I10 ESSENTIAL HYPERTENSION: ICD-10-CM

## 2019-03-28 DIAGNOSIS — E66.9 CLASS 1 OBESITY WITH SERIOUS COMORBIDITY IN ADULT, UNSPECIFIED BMI, UNSPECIFIED OBESITY TYPE: ICD-10-CM

## 2019-03-28 DIAGNOSIS — N18.2 CHRONIC KIDNEY DISEASE (CKD), STAGE II (MILD): ICD-10-CM

## 2019-03-28 DIAGNOSIS — E78.00 HYPERCHOLESTEROLEMIA: ICD-10-CM

## 2019-03-28 DIAGNOSIS — K76.0 FATTY LIVER: ICD-10-CM

## 2019-03-28 LAB — HBA1C MFR BLD HPLC: 5.7 %

## 2019-03-28 RX ORDER — ATORVASTATIN CALCIUM 20 MG/1
TABLET, FILM COATED ORAL
Qty: 30 TAB | Refills: 5 | Status: SHIPPED | OUTPATIENT
Start: 2019-03-28 | End: 2019-07-16 | Stop reason: SDUPTHER

## 2019-03-28 RX ORDER — CYANOCOBALAMIN (VITAMIN B-12) 1000 MCG
1 TABLET, EXTENDED RELEASE ORAL EVERY OTHER DAY
Qty: 15 TAB | Refills: 5 | Status: SHIPPED | OUTPATIENT
Start: 2019-03-28 | End: 2019-07-16 | Stop reason: SDUPTHER

## 2019-03-28 RX ORDER — GLIPIZIDE 5 MG/1
TABLET ORAL
Qty: 30 TAB | Refills: 5 | Status: SHIPPED | OUTPATIENT
Start: 2019-03-28 | End: 2019-07-16 | Stop reason: SDUPTHER

## 2019-03-28 RX ORDER — LOSARTAN POTASSIUM 100 MG/1
TABLET ORAL
Qty: 30 TAB | Refills: 5 | Status: SHIPPED | OUTPATIENT
Start: 2019-03-28 | End: 2019-07-16 | Stop reason: SDUPTHER

## 2019-03-28 NOTE — PROGRESS NOTES
1. Have you been to the ER, urgent care clinic since your last visit? Hospitalized since your last visit? No    2. Have you seen or consulted any other health care providers outside of the [address] [place] [address] since your last visit? Include any pap smears or colon screening. Yes 02- [facility] Eye Exam     Annual eye exam: 02-  Pneumococcal vaccine: N/A  Flu vaccine: 09-  Patient instructed to remove shoes:  Yes

## 2019-03-28 NOTE — PATIENT INSTRUCTIONS
A Healthy Lifestyle: Care Instructions  Your Care Instructions    A healthy lifestyle can help you feel good, stay at a healthy weight, and have plenty of energy for both work and play. A healthy lifestyle is something you can share with your whole family. A healthy lifestyle also can lower your risk for serious health problems, such as high blood pressure, heart disease, and diabetes. You can follow a few steps listed below to improve your health and the health of your family. Follow-up care is a key part of your treatment and safety. Be sure to make and go to all appointments, and call your doctor if you are having problems. It's also a good idea to know your test results and keep a list of the medicines you take. How can you care for yourself at home? · Do not eat too much sugar, fat, or fast foods. You can still have dessert and treats now and then. The goal is moderation. · Start small to improve your eating habits. Pay attention to portion sizes, drink less juice and soda pop, and eat more fruits and vegetables. ? Eat a healthy amount of food. A 3-ounce serving of meat, for example, is about the size of a deck of cards. Fill the rest of your plate with vegetables and whole grains. ? Limit the amount of soda and sports drinks you have every day. Drink more water when you are thirsty. ? Eat at least 5 servings of fruits and vegetables every day. It may seem like a lot, but it is not hard to reach this goal. A serving or helping is 1 piece of fruit, 1 cup of vegetables, or 2 cups of leafy, raw vegetables. Have an apple or some carrot sticks as an afternoon snack instead of a candy bar. Try to have fruits and/or vegetables at every meal.  · Make exercise part of your daily routine. You may want to start with simple activities, such as walking, bicycling, or slow swimming. Try to be active 30 to 60 minutes every day. You do not need to do all 30 to 60 minutes all at once.  For example, you can exercise 3 times a day for 10 or 20 minutes. Moderate exercise is safe for most people, but it is always a good idea to talk to your doctor before starting an exercise program.  · Keep moving. Jeffjarad Feeler the lawn, work in the garden, or [employer]. Take the stairs instead of the elevator at work. · If you smoke, quit. People who smoke have an increased risk for heart attack, stroke, cancer, and other lung illnesses. Quitting is hard, but there are ways to boost your chance of quitting tobacco for good. ? Use nicotine gum, patches, or lozenges. ? Ask your doctor about stop-smoking programs and medicines. ? Keep trying. In addition to reducing your risk of diseases in the future, you will notice some benefits soon after you stop using tobacco. If you have shortness of breath or asthma symptoms, they will likely get better within a few weeks after you quit. · Limit how much alcohol you drink. Moderate amounts of alcohol (up to 2 drinks a day for men, 1 drink a day for women) are okay. But drinking too much can lead to liver problems, high blood pressure, and other health problems. Family health  If you have a family, there are many things you can do together to improve your health. · Eat meals together as a family as often as possible. · Eat healthy foods. This includes fruits, vegetables, lean meats and dairy, and whole grains. · Include your family in your fitness plan. Most people think of activities such as jogging or tennis as the way to fitness, but there are many ways you and your family can be more active. Anything that makes you breathe hard and gets your heart pumping is exercise. Here are some tips:  ? Walk to do errands or to take your child to school or the bus.  ? Go for a family bike ride after dinner instead of watching TV. Where can you learn more? Go to http://tammie-angelica.info/. Enter G240 in the search box to learn more about \"A Healthy Lifestyle: Care Instructions. \"  Current as of: September 11, 2018  Content Version: 11.9  © 1971-0798 [employer]. Care instructions adapted under license by [employer] (which disclaims liability or warranty for this information). If you have questions about a medical condition or this instruction, always ask your healthcare professional. [address] any warranty or liability for your use of this information. Follow up in 6 months routine care.  POC A1C in office

## 2019-03-28 NOTE — PROGRESS NOTES
SUBJECTIVE  Chief Complaint   Patient presents with    Cholesterol Problem    Chronic Kidney Disease    Erectile Dysfunction    Fatty Liver    Hypertension     Patient presents for follow-up on their diabetes and high cholesterol. He has no complaints with respect to diabetes. He has had labs in Jan 2019 showing an A1c in the 6% low range. He also has a history of fatty liver. He has no myalgias or cramping. He has been taking glipizide twice daily without any signs or symptoms of hypoglycemia. We also reviewed their cardiac risk factors. The patient is still not checking home blood sugars. He has had an annual eye exam.  Denies any polyuria, polydipsia, or polyphagia. Denies any weight loss. His psychiatrist has him on Depakote and haldol. He is compliant with his meds without side effects aside from a tremor. He has regular labs to assess Depakote levels and they are borderline elevated. ROS:  Complete 10 system ROS is obtained from the patient which is negative besides nail changes. He says that his nails do not grow when his iron is low. OBJECTIVE  Blood pressure 120/76, pulse 88, temperature 98.2 °F (36.8 °C), temperature source Oral, resp. rate 16, height 6' (1.829 m), weight 248 lb (112.5 kg), SpO2 98 %. General:  alert, cooperative, well appearing, in no apparent distress. HEENT:  NC/AT, PERRLA, EOMI, MMM, no oral mucosal lesions. Nasal passages patent without congestion. Neck:  no thyromegaly. No masses. No carotid bruits. Lymph: No LAD in neck, axillae, or groin. CV:  The heart sounds are regular in rate and rhythm. There is a normal S1 and S2. There or no murmurs. Lungs: Inspiratory and expiratory efforts are full and unlabored. Lung sounds are clear and equal to auscultation throughout all lung fields without wheezing, rales, or rhonchi. GI:  The abdomen is soft and nontender. There is no hepatosplenomegaly or other masses.   There is no rebound or guarding. Neuro:  CN2-12 intact. No motor or sensory deficits. Normal muscle tone. No tremors or rigidity. Extremities / MSK:  No swelling. No pedal edema. No joint enlargement or swelling. No warm joints. Psych: normal affect. Mood good. Oriented x 3. Judgement and insight intact. Results for orders placed or performed in visit on 03/28/19   AMB POC HEMOGLOBIN A1C   Result Value Ref Range    Hemoglobin A1c (POC) 5.7 %       Labs from January scanned and reviewed. Valproic levels are slightly over 100. ASSESSMENT / PLAN    ICD-10-CM ICD-9-CM    1. Diabetes mellitus without complication (HCC) U93.4 250.00 AMB POC HEMOGLOBIN A1C      glipiZIDE (GLUCOTROL) 5 mg tablet   2. Essential hypertension I10 401.9 losartan (COZAAR) 100 mg tablet   3. Hypercholesterolemia E78.00 272.0 atorvastatin (LIPITOR) 20 mg tablet   4. Chronic kidney disease (CKD), stage II (mild) N18.2 585.2    5. Fatty liver K76.0 571.8    6. Class 1 obesity with serious comorbidity in adult, unspecified BMI, unspecified obesity type E66.9 278.00    7. Chronic paranoid schizophrenia (HCC) F20.0 295.32      Reviewed labs - no hepatotoxicity. Mild elevation of valproic acid. Diabetes -   Continue current care. Refills as needed. He will check his feet daily. Cont annual eye exams. Reviewed latest eye exam.     Hypercholesterolemia - controlled. Cont Lipitor 20mg nightly. No hepatotoxicity. Cont to monitor. HTN with CKD - renal function is normal.  HTN is well controlled. He will continue losartan. Obesity / Fatty liver - avoid alcohol. Diet and exercise. Schizophrenia - continue follow-up with psychiatry. Meds reviewed. Cont to monitor for valproic acid toxicity along with psychiatry. All chart history elements were reviewed by me at the time of the visit even though marked at time of note closure. Patient understands our medical plan. Patient has provided input and agrees with goals.  Alternatives have been explained and offered. All questions answered. The patient is to call if condition worsens or fails to improve. Follow-up and Dispositions    · Return in about 6 months (around 9/28/2019) for  routine care. POC A1C in office .

## 2019-06-11 ENCOUNTER — TELEPHONE (OUTPATIENT)
Dept: FAMILY MEDICINE CLINIC | Age: 39
End: 2019-06-11

## 2019-06-11 NOTE — TELEPHONE ENCOUNTER
Pt states that he has a cold and would like to know what he can take for it seeing that he has diabetes and HTN. Please advise.

## 2019-06-11 NOTE — TELEPHONE ENCOUNTER
Spoke with Ernie Whitlye, whom stated he has already spoken with the [address] that has recommended a cough syrup due to his HTN and DM diagnosis

## 2019-07-16 ENCOUNTER — HOSPITAL ENCOUNTER (OUTPATIENT)
Dept: LAB | Age: 39
Discharge: HOME | End: 2019-07-16
Payer: MEDICAID

## 2019-07-16 ENCOUNTER — OFFICE VISIT (OUTPATIENT)
Dept: FAMILY MEDICINE CLINIC | Age: 39
End: 2019-07-16

## 2019-07-16 VITALS
SYSTOLIC BLOOD PRESSURE: 120 MMHG | HEART RATE: 85 BPM | OXYGEN SATURATION: 97 % | RESPIRATION RATE: 14 BRPM | DIASTOLIC BLOOD PRESSURE: 70 MMHG | BODY MASS INDEX: 27.77 KG/M2 | HEIGHT: 72 IN | WEIGHT: 205 LBS | TEMPERATURE: 98.5 F

## 2019-07-16 DIAGNOSIS — E78.00 HYPERCHOLESTEROLEMIA: ICD-10-CM

## 2019-07-16 DIAGNOSIS — E11.9 DIABETES MELLITUS WITHOUT COMPLICATION (HCC): Primary | ICD-10-CM

## 2019-07-16 DIAGNOSIS — N18.2 CHRONIC KIDNEY DISEASE (CKD), STAGE II (MILD): ICD-10-CM

## 2019-07-16 DIAGNOSIS — D50.9 IRON DEFICIENCY ANEMIA, UNSPECIFIED IRON DEFICIENCY ANEMIA TYPE: ICD-10-CM

## 2019-07-16 DIAGNOSIS — E66.3 OVERWEIGHT (BMI 25.0-29.9): ICD-10-CM

## 2019-07-16 DIAGNOSIS — I10 ESSENTIAL HYPERTENSION: ICD-10-CM

## 2019-07-16 DIAGNOSIS — F20.0 CHRONIC PARANOID SCHIZOPHRENIA (HCC): ICD-10-CM

## 2019-07-16 DIAGNOSIS — K76.0 FATTY LIVER: ICD-10-CM

## 2019-07-16 DIAGNOSIS — E11.9 DIABETES MELLITUS WITHOUT COMPLICATION (HCC): ICD-10-CM

## 2019-07-16 LAB
ALBUMIN SERPL-MCNC: 3.9 G/DL (ref 3.4–5)
ALBUMIN/GLOB SERPL: 1.1 {RATIO} (ref 0.8–1.7)
ALP SERPL-CCNC: 81 U/L (ref 45–117)
ALT SERPL-CCNC: 28 U/L (ref 16–61)
ANION GAP SERPL CALC-SCNC: 5 MMOL/L (ref 3–18)
AST SERPL-CCNC: 19 U/L (ref 15–37)
BILIRUB SERPL-MCNC: 0.3 MG/DL (ref 0.2–1)
BUN SERPL-MCNC: 12 MG/DL (ref 7–18)
BUN/CREAT SERPL: 9 (ref 12–20)
CALCIUM SERPL-MCNC: 9.4 MG/DL (ref 8.5–10.1)
CHLORIDE SERPL-SCNC: 106 MMOL/L (ref 100–108)
CHOLEST SERPL-MCNC: 158 MG/DL
CO2 SERPL-SCNC: 31 MMOL/L (ref 21–32)
CREAT SERPL-MCNC: 1.27 MG/DL (ref 0.6–1.3)
CREAT UR-MCNC: 284 MG/DL (ref 30–125)
ERYTHROCYTE [DISTWIDTH] IN BLOOD BY AUTOMATED COUNT: 15.8 % (ref 11.6–14.5)
FERRITIN SERPL-MCNC: 123 NG/ML (ref 8–388)
GLOBULIN SER CALC-MCNC: 3.5 G/DL (ref 2–4)
GLUCOSE SERPL-MCNC: 93 MG/DL (ref 74–99)
HBA1C MFR BLD: 5.9 % (ref 4.2–5.6)
HCT VFR BLD AUTO: 44.7 % (ref 36–48)
HDLC SERPL-MCNC: 70 MG/DL (ref 40–60)
HDLC SERPL: 2.3 {RATIO} (ref 0–5)
HGB BLD-MCNC: 14.9 G/DL (ref 13–16)
LDLC SERPL CALC-MCNC: 66.2 MG/DL (ref 0–100)
LIPID PROFILE,FLP: ABNORMAL
MCH RBC QN AUTO: 28.4 PG (ref 24–34)
MCHC RBC AUTO-ENTMCNC: 33.3 G/DL (ref 31–37)
MCV RBC AUTO: 85.1 FL (ref 74–97)
MICROALBUMIN UR-MCNC: 0.78 MG/DL (ref 0–3)
MICROALBUMIN/CREAT UR-RTO: 3 MG/G (ref 0–30)
PLATELET # BLD AUTO: 186 K/UL (ref 135–420)
PMV BLD AUTO: 10.7 FL (ref 9.2–11.8)
POTASSIUM SERPL-SCNC: 4.1 MMOL/L (ref 3.5–5.5)
PROT SERPL-MCNC: 7.4 G/DL (ref 6.4–8.2)
RBC # BLD AUTO: 5.25 M/UL (ref 4.7–5.5)
SODIUM SERPL-SCNC: 142 MMOL/L (ref 136–145)
TRIGL SERPL-MCNC: 109 MG/DL (ref ?–150)
VLDLC SERPL CALC-MCNC: 21.8 MG/DL
WBC # BLD AUTO: 6.9 K/UL (ref 4.6–13.2)

## 2019-07-16 PROCEDURE — 82728 ASSAY OF FERRITIN: CPT

## 2019-07-16 PROCEDURE — 80061 LIPID PANEL: CPT

## 2019-07-16 PROCEDURE — 83036 HEMOGLOBIN GLYCOSYLATED A1C: CPT

## 2019-07-16 PROCEDURE — 85027 COMPLETE CBC AUTOMATED: CPT

## 2019-07-16 PROCEDURE — 82043 UR ALBUMIN QUANTITATIVE: CPT

## 2019-07-16 PROCEDURE — 36415 COLL VENOUS BLD VENIPUNCTURE: CPT

## 2019-07-16 PROCEDURE — 80053 COMPREHEN METABOLIC PANEL: CPT

## 2019-07-16 RX ORDER — ATORVASTATIN CALCIUM 20 MG/1
TABLET, FILM COATED ORAL
Qty: 30 TAB | Refills: 5 | Status: SHIPPED | OUTPATIENT
Start: 2019-07-16

## 2019-07-16 RX ORDER — GLIPIZIDE 5 MG/1
TABLET ORAL
Qty: 30 TAB | Refills: 5 | Status: SHIPPED | OUTPATIENT
Start: 2019-07-16

## 2019-07-16 RX ORDER — CYANOCOBALAMIN (VITAMIN B-12) 1000 MCG
1 TABLET, EXTENDED RELEASE ORAL EVERY OTHER DAY
Qty: 15 TAB | Refills: 5 | Status: SHIPPED | OUTPATIENT
Start: 2019-07-16

## 2019-07-16 RX ORDER — LOSARTAN POTASSIUM 100 MG/1
TABLET ORAL
Qty: 30 TAB | Refills: 5 | Status: SHIPPED | OUTPATIENT
Start: 2019-07-16

## 2019-07-16 NOTE — PATIENT INSTRUCTIONS

## 2019-07-16 NOTE — PROGRESS NOTES
1. Have you been to the ER, urgent care clinic since your last visit? Hospitalized since your last visit? Yes Where: [facility]    2. Have you seen or consulted any other health care providers outside of the [address] [place] [address] since your last visit? Include any pap smears or colon screening.  Yes Where: Dr. Garo Mckeon
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
## 2020-03-05 ENCOUNTER — HOSPITAL ENCOUNTER (EMERGENCY)
Age: 40
Discharge: HOME | End: 2020-03-07
Attending: EMERGENCY MEDICINE
Payer: MEDICAID

## 2020-03-05 DIAGNOSIS — F10.10 ALCOHOL ABUSE: Primary | ICD-10-CM

## 2020-03-05 LAB
ALBUMIN SERPL-MCNC: 3.6 G/DL (ref 3.4–5)
ALBUMIN/GLOB SERPL: 0.9 {RATIO} (ref 0.8–1.7)
ALP SERPL-CCNC: 104 U/L (ref 45–117)
ALT SERPL-CCNC: 34 U/L (ref 16–61)
AMPHET UR QL SCN: NEGATIVE
ANION GAP SERPL CALC-SCNC: 9 MMOL/L (ref 3–18)
AST SERPL-CCNC: 23 U/L (ref 10–38)
BARBITURATES UR QL SCN: NEGATIVE
BASOPHILS # BLD: 0 K/UL (ref 0–0.1)
BASOPHILS NFR BLD: 0 % (ref 0–2)
BENZODIAZ UR QL: NEGATIVE
BILIRUB SERPL-MCNC: 0.1 MG/DL (ref 0.2–1)
BUN SERPL-MCNC: 9 MG/DL (ref 7–18)
BUN/CREAT SERPL: 8 (ref 12–20)
CALCIUM SERPL-MCNC: 9.2 MG/DL (ref 8.5–10.1)
CANNABINOIDS UR QL SCN: POSITIVE
CHLORIDE SERPL-SCNC: 109 MMOL/L (ref 100–111)
CO2 SERPL-SCNC: 25 MMOL/L (ref 21–32)
COCAINE UR QL SCN: NEGATIVE
CREAT SERPL-MCNC: 1.1 MG/DL (ref 0.6–1.3)
DIFFERENTIAL METHOD BLD: ABNORMAL
EOSINOPHIL # BLD: 0.1 K/UL (ref 0–0.4)
EOSINOPHIL NFR BLD: 1 % (ref 0–5)
ERYTHROCYTE [DISTWIDTH] IN BLOOD BY AUTOMATED COUNT: 14 % (ref 11.6–14.5)
ETHANOL SERPL-MCNC: 21 MG/DL (ref 0–3)
GLOBULIN SER CALC-MCNC: 3.9 G/DL (ref 2–4)
GLUCOSE SERPL-MCNC: 188 MG/DL (ref 74–99)
HCT VFR BLD AUTO: 39.9 % (ref 36–48)
HDSCOM,HDSCOM: ABNORMAL
HGB BLD-MCNC: 13.2 G/DL (ref 13–16)
LYMPHOCYTES # BLD: 2 K/UL (ref 0.9–3.6)
LYMPHOCYTES NFR BLD: 44 % (ref 21–52)
MCH RBC QN AUTO: 27.2 PG (ref 24–34)
MCHC RBC AUTO-ENTMCNC: 33.1 G/DL (ref 31–37)
MCV RBC AUTO: 82.3 FL (ref 74–97)
METHADONE UR QL: NEGATIVE
MONOCYTES # BLD: 0.3 K/UL (ref 0.05–1.2)
MONOCYTES NFR BLD: 7 % (ref 3–10)
NEUTS SEG # BLD: 2.2 K/UL (ref 1.8–8)
NEUTS SEG NFR BLD: 48 % (ref 40–73)
OPIATES UR QL: NEGATIVE
PCP UR QL: NEGATIVE
PLATELET # BLD AUTO: 250 K/UL (ref 135–420)
PMV BLD AUTO: 10.2 FL (ref 9.2–11.8)
POTASSIUM SERPL-SCNC: 3.9 MMOL/L (ref 3.5–5.5)
PROT SERPL-MCNC: 7.5 G/DL (ref 6.4–8.2)
RBC # BLD AUTO: 4.85 M/UL (ref 4.7–5.5)
SODIUM SERPL-SCNC: 143 MMOL/L (ref 136–145)
WBC # BLD AUTO: 4.5 K/UL (ref 4.6–13.2)

## 2020-03-05 PROCEDURE — 80307 DRUG TEST PRSMV CHEM ANLYZR: CPT

## 2020-03-05 PROCEDURE — 80053 COMPREHEN METABOLIC PANEL: CPT

## 2020-03-05 PROCEDURE — 99284 EMERGENCY DEPT VISIT MOD MDM: CPT

## 2020-03-05 PROCEDURE — 85025 COMPLETE CBC W/AUTO DIFF WBC: CPT

## 2020-03-06 NOTE — ED PROVIDER NOTES
EMERGENCY DEPARTMENT HISTORY AND PHYSICAL EXAM    7:33 PM      Date: 3/5/2020  Patient Name: Shannon Nam    History of Presenting Illness     Chief Complaint   Patient presents with    Alcohol Problem         History Provided By: Patient    Additional History (Context): Shannon Nam is a 37 y.o. male with Past medical history of GERD, hypertension, PTSD, schizophrenia, diabetes, alcohol abuse who presents with chief complaint of wanting alcohol detox. Many years daily. States that he drinks anything including beer, liquor or wine. He states his last drink was about an hour ago. He states that he is tired of drinking and wants detox. He reports that he has had detox in the past.  No suicidal ideation, homicidal ideation tremors, vomiting, fever, cough, and no other complaint. PCP: Bettina Hammans, MD        Past History     Past Medical History:  Past Medical History:   Diagnosis Date    Crossed renal ectopia     sees nephrology    [facility]     Embolism - blood clot 2008    right thigh    GERD (gastroesophageal reflux disease) 2011    Hematuria 2012    History of DVT (deep vein thrombosis) 2007    Hypertension     PTSD (post-traumatic stress disorder)     Sarcoidosis     Schizophrenia, paranoid type ([facility]        Past Surgical History:  History reviewed. No pertinent surgical history. Family History:  Family History   Problem Relation Age of Onset    Alcohol abuse Mother     Hypertension Mother     Depression Father     Asthma Brother     Alcohol abuse Maternal Uncle     Alcohol abuse Paternal Uncle     Diabetes Maternal Grandmother     Depression Maternal Aunt        Social History:  Social History     Tobacco Use    Smoking status: Former Smoker     Packs/day: 0.10     Years: 15.00     Pack years: 1.50     Types: Cigars, Cigarettes    Smokeless tobacco: Never Used    Tobacco comment: Black and Milds - 4   Substance Use Topics    Alcohol use:  Yes     Alcohol/week: 2.5 standard drinks     Types: 3 Cans of beer per week     Frequency: 2-4 times a month     Drinks per session: 1 or 2     Binge frequency: Never     Comment: rare use    Drug use: Yes     Frequency: 3.0 times per week     Types: Marijuana       Allergies: Allergies   Allergen Reactions    Norvasc [Amlodipine] Swelling     Leg swelling         Review of Systems       Review of Systems   Constitutional: Negative for chills and fever. HENT: Negative for congestion, rhinorrhea, sore throat and trouble swallowing. Eyes: Negative for visual disturbance. Respiratory: Negative for cough, shortness of breath and wheezing. Cardiovascular: Negative for chest pain and leg swelling. Gastrointestinal: Negative for abdominal pain, nausea and vomiting. Endocrine: Negative for polyuria. Genitourinary: Negative for difficulty urinating and dysuria. Musculoskeletal: Negative for arthralgias and neck stiffness. Skin: Negative for rash. Neurological: Negative for dizziness, tremors, seizures, weakness, numbness and headaches. Hematological: Does not bruise/bleed easily. Psychiatric/Behavioral: Negative for confusion and dysphoric mood. All other systems reviewed and are negative. Physical Exam     Visit Vitals  BP (!) 144/93 (BP 1 Location: Left arm, BP Patient Position: Sitting)   Pulse 100   Temp 98 °F (36.7 °C)   Resp 18   SpO2 100%         Physical Exam  Vitals signs and nursing note reviewed. Constitutional:       General: He is not in acute distress. Appearance: He is well-developed. He is not toxic-appearing or diaphoretic. HENT:      Head: Normocephalic and atraumatic. Nose: Nose normal.      Mouth/Throat:      Mouth: Mucous membranes are moist.   Eyes:      General: No scleral icterus. Conjunctiva/sclera: Conjunctivae normal.      Pupils: Pupils are equal, round, and reactive to light. Neck:      Musculoskeletal: Normal range of motion and neck supple.    Cardiovascular: Rate and Rhythm: Normal rate. Comments: Capillary refill < 3 seconds  Pulmonary:      Effort: Pulmonary effort is normal. No respiratory distress. Breath sounds: Normal breath sounds. No wheezing. Abdominal:      General: Bowel sounds are normal. There is no distension. Palpations: Abdomen is soft. Tenderness: There is no abdominal tenderness. Musculoskeletal: Normal range of motion. Lymphadenopathy:      Cervical: No cervical adenopathy. Skin:     General: Skin is warm and dry. Neurological:      Mental Status: He is alert and oriented to person, place, and time. Cranial Nerves: No cranial nerve deficit. Sensory: No sensory deficit. Motor: No weakness. Coordination: Coordination normal.      Comments: No tremors   Psychiatric:         Thought Content: Thought content normal.         Judgment: Judgment normal.           Diagnostic Study Results     Labs -  Recent Results (from the past 12 hour(s))   DRUG SCREEN, URINE    Collection Time: 03/05/20  7:08 PM   Result Value Ref Range    BENZODIAZEPINES NEGATIVE  NEG      BARBITURATES NEGATIVE  NEG      THC (TH-CANNABINOL) POSITIVE (A) NEG      OPIATES NEGATIVE  NEG      PCP(PHENCYCLIDINE) NEGATIVE  NEG      COCAINE NEGATIVE  NEG      AMPHETAMINES NEGATIVE  NEG      METHADONE NEGATIVE  NEG      HDSCOM (NOTE)    CBC WITH AUTOMATED DIFF    Collection Time: 03/05/20  9:33 PM   Result Value Ref Range    WBC 4.5 (L) 4.6 - 13.2 K/uL    RBC 4.85 4.70 - 5.50 M/uL    HGB 13.2 13.0 - 16.0 g/dL    HCT 39.9 36.0 - 48.0 %    MCV 82.3 74.0 - 97.0 FL    MCH 27.2 24.0 - 34.0 PG    MCHC 33.1 31.0 - 37.0 g/dL    RDW 14.0 11.6 - 14.5 %    PLATELET 751 598 - 244 K/uL    MPV 10.2 9.2 - 11.8 FL    NEUTROPHILS 48 40 - 73 %    LYMPHOCYTES 44 21 - 52 %    MONOCYTES 7 3 - 10 %    EOSINOPHILS 1 0 - 5 %    BASOPHILS 0 0 - 2 %    ABS. NEUTROPHILS 2.2 1.8 - 8.0 K/UL    ABS. LYMPHOCYTES 2.0 0.9 - 3.6 K/UL    ABS.  MONOCYTES 0.3 0.05 - 1.2 K/UL ABS. EOSINOPHILS 0.1 0.0 - 0.4 K/UL    ABS. BASOPHILS 0.0 0.0 - 0.1 K/UL    DF AUTOMATED     METABOLIC PANEL, COMPREHENSIVE    Collection Time: 03/05/20  9:33 PM   Result Value Ref Range    Sodium 143 136 - 145 mmol/L    Potassium 3.9 3.5 - 5.5 mmol/L    Chloride 109 100 - 111 mmol/L    CO2 25 21 - 32 mmol/L    Anion gap 9 3.0 - 18 mmol/L    Glucose 188 (H) 74 - 99 mg/dL    BUN 9 7.0 - 18 MG/DL    Creatinine 1.10 0.6 - 1.3 MG/DL    BUN/Creatinine ratio 8 (L) 12 - 20      GFR est AA >60 >60 ml/min/1.73m2    GFR est non-AA >60 >60 ml/min/1.73m2    Calcium 9.2 8.5 - 10.1 MG/DL    Bilirubin, total 0.1 (L) 0.2 - 1.0 MG/DL    ALT (SGPT) 34 16 - 61 U/L    AST (SGOT) 23 10 - 38 U/L    Alk. phosphatase 104 45 - 117 U/L    Protein, total 7.5 6.4 - 8.2 g/dL    Albumin 3.6 3.4 - 5.0 g/dL    Globulin 3.9 2.0 - 4.0 g/dL    A-G Ratio 0.9 0.8 - 1.7     ETHYL ALCOHOL    Collection Time: 03/05/20  9:33 PM   Result Value Ref Range    ALCOHOL(ETHYL),SERUM 21 (H) 0 - 3 MG/DL       Radiologic Studies -   No orders to display         Medical Decision Making   I am the first provider for this patient. I reviewed the vital signs, available nursing notes, past medical history, past surgical history, family history and social history. Vital Signs-Reviewed the patient's vital signs. Records Reviewed: Nursing Notes and Old Medical Records (Time of Review: 7:33 PM)    Provider Notes (Medical Decision Making): Patient with history of alcohol abuse PTSD. Here asking for help detoxing alcohol. Will check labs to clear for crisis to evaluate him. MDM    Medications - No data to display        ED Course: Progress Notes, Reevaluation, and Consults:  Labs cleared for crisis evaluate    11:08 PM  I discussed patient case with Rhianna Pineda from [facility]. She states she will come evaluate patient in the ED.     Patient has been seen by Rhianna fierro Rafter is trying to get patient admitted to [facility] where he has gone in the past for detox. She states she will let me know the plan. 3:15 AM  Reassessed patient, patient resting, nonlabored breathing, no apparent distress. 8:35 AM : Pt care transferred to    ,ED provider. History of patient complaint(s), available diagnostic reports and current treatment plan has been discussed thoroughly. Bedside rounding on patient occured : yes . Intended disposition of patient : TBD  Pending diagnostics reports and/or labs (please list): Crisis looking for bed placement, attempting to send him to [facility] for alcohol detox    Diagnosis     Clinical Impression:   1. Alcohol abuse        Disposition:     Follow-up Information    None              Alberto Shen DO    Dragon medical dictation software was used for portions of this report. Unintended transcription errors may occur. My signature above authenticates this document and my orders, the final    diagnosis (es), discharge prescription (s), and instructions in the Epic    record.

## 2020-03-06 NOTE — BSMART NOTE
36 yo male pt interviewed at the request of Dr Fern Monsivais. Pt resting quietly on bed upon my arrival. Reports coming to the ED related to wanting to detox from alcohol. States he drinks a pint or more daily and has drank this much for the past year or longer. Last drink was prior to coming to the ED last night. Reports trying detox before but having difficulty staying sober after discharge. Was told by psychiatrist to come to the ED so that he could be sent to an acute detox facility. Pt requesting to go to [facility]. Denies drug use. Denies s/i h/i a/vh at this time as well. States he simply wants help \"getting sober and staying sober\" reports having a home to go to upon any subsequent discharge. Denies legal issues. Reports medical issues of diabetes and asthma. States he has not been taking his medications as prescribed because he \"drinks too much and forgets\". Pt would like to get acutely detoxed then go to a 30 day rehab facility. No other issues voiced or noted at this time. Dr Fern Monsivais notified.  Will send pt chart [facility] to [facility] for review for their ARTS program.

## 2020-03-06 NOTE — ED NOTES
Verbal shift change report given to LadonnaRN (oncoming nurse) by Luigi Berry RN (offgoing nurse). Report included the following information SBAR, Kardex and ED Summary. Patient sleeping, call bell w/in reach, no further needs expressed at this time, aware of POC.

## 2020-03-06 NOTE — ED NOTES
Breakfast tray provided to the patient. Assisted up to side of stretcher to eat. Awaiting inpatient bed assignment. Explanation of wait provided to the patient. Patient verbalized understanding. Will continue to monitor.

## 2020-03-06 NOTE — BSMART NOTE
Detox bed search continues. [facility] is without a bed at this time as per Roxann Serrano; Dr. Holloway Patient notified.

## 2020-03-06 NOTE — BSMART NOTE
[facility] called and stated they do not have an available bed at this time but may have one later this morning after possible discharges.

## 2020-03-06 NOTE — ED NOTES
Assumed care of patient. Received patient resting on stretcher, with eyes closed. Respirations regular and unlabored. Will not disturb patient at this time. Will assess once he wakes up this morning.

## 2020-03-07 VITALS
SYSTOLIC BLOOD PRESSURE: 132 MMHG | HEART RATE: 81 BPM | DIASTOLIC BLOOD PRESSURE: 87 MMHG | TEMPERATURE: 97 F | RESPIRATION RATE: 16 BRPM | OXYGEN SATURATION: 99 %

## 2020-03-07 PROCEDURE — 74011250637 HC RX REV CODE- 250/637: Performed by: EMERGENCY MEDICINE

## 2020-03-07 RX ORDER — LORAZEPAM 1 MG/1
1 TABLET ORAL
Status: DISCONTINUED | OUTPATIENT
Start: 2020-03-07 | End: 2020-03-07 | Stop reason: HOSPADM

## 2020-03-07 RX ADMIN — LORAZEPAM 1 MG: 1 TABLET ORAL at 04:30

## 2020-03-07 NOTE — ED NOTES
Spoke with Mely Irizarry from [facility] regarding plans for patient. Currently waiting for accepting facility.  Patient updated

## 2020-03-07 NOTE — ED NOTES
Called from [facility] from [facility]" The patient does not meet the criteria  due to CWIA of 3

## 2020-03-07 NOTE — ED NOTES
Pt resting comfortably at this time, per [facility] pt is awaiting [facility], no beds available at [facility]. Pt has been calm and cooperative, eating well.

## 2020-03-07 NOTE — ED NOTES
Patient resting sleeping, call bell w/in reach, no further needs expressed at this time, aware of POC.

## 2020-03-07 NOTE — BSMART NOTE
No local bed available at this time for detox. Per Shelly Dickerson, [facility] clinician, no available community resources at [facility] or  [facility] available for detox only cases on the week-end. Information re-faxed to [facility] Detox unit for admission request in [place], [place]. Phone interview with client will be arranged in ED. Patient Education     RICE     Rest an injury, elevate it, and use ice and compression as directed.   RICE stands for rest, ice, compression, and elevation. These can limit pain and swelling after an injury. RICE may be recommended to help treat fractures, sprains, strains, and bruises or bumps.   Home care  The following explain the details of RICE:  · Rest. Limit the use of the injured body part. This helps prevent further damage to the body part and gives it time to heal. In some cases, you may need a sling, brace, splint, or cast to help keep the body part still until it has healed.  · Ice. Applying ice right after an injury helps relieve pain and swelling. Wrap a bag of ice in a thin towel. Then, place it over the injured area. Do this for 10 to 15 minutes every 3 to 4 hours. Continue for the next 1 to 3 days or until your symptoms improve. Never put ice directly on your skin or ice an area longer than 15 minutes at a time.  · Compression. Putting pressure on an injury helps reduce swelling and provides support. Wrap the injured area firmly with an elastic bandage/wrap. Make sure not to wrap the bandage too tightly or you will cut off blood flow to the injured area. If your bandage loosens, rewrap it.  · Elevation. Keeping an injury raised above the level of your heart reduces swelling, pain, and throbbing. For instance, if you have a broken leg, it may help to rest your leg on several pillows when sitting or lying down. Try to keep the injured area elevated for at least 2 to 3 hours per day.  Follow-up care  Follow up with your healthcare provider, or as advised.  When to seek medical advice  Call your healthcare provider right away if any of these occur:  · Fever of 100.4°F (38°C) or higher, or as directed by your healthcare provider  · Increased pain or swelling in the injured body part  · Injured body part becomes cold, blue, numb, or tingly  · Signs of infection. These include warmth in the skin, redness,  drainage, or bad smell coming from the injured body part.  Date Last Reviewed: 1/18/2016  © 2533-7915 [employer]. [address] [place] [address], [place] [address]. All rights reserved. This information is not intended as a substitute for professional medical care. Always follow your healthcare professional's instructions.           ________________________________________________________________    Thank you for visiting the [facility], [place].    Please understand this is a provisional diagnosis that can and may change. The diagnosis that you are discharged with is based on the symptoms you described and the diagnostic information available today. If any new symptoms occur or worsen, you should seek immediate re-evaluation at the nearest emergency department. If any symptoms persist, please follow up for reassessment with your primary care provider.     It is difficult to recognize all elements of any illness or injury in a single visit. The examination, treatment, and x-rays received are on a preliminary basis only. A radiologist will also review your x-rays for final reading. Call your primary care provider if you have questions or problems before your next appointment. If you are unable to  reach your Primary Care Provider (PCP), please call or return to the Urgent Care Clinic.  If symptoms worsen or do not resolve please follow-up with your Primary Care Provider (PCP), urgent care or the nearest  emergency room for emergency symptoms.  If you are unsure of whom to follow up with, call [phone] and ask to speak with either your PCP, the walk-In nurse or the on-call provider if you are calling after hours.      If you are referred to a specialist or scheduled for a test, our referrals department will call you with your appointment date and time within 3 business days. If you have not heard from them in this time frame, please call [phone] and ask for the referrals department.      Test results: Unless otherwise instructed, you should be notified of test results within one week. Please call our office if you do not hear from us within this time frame at [phone].     Clinic Hours:  Monday through Friday: 7:00 am to 7:00 pm  Saturday: 7:00 am to 3:00 pm  Sunday: 7:00 am to 3:00 pm  Holiday hours may vary, please call [phone] on Holidays.  Walk-in is open 365 days a year!    Help us to grow our quality of service! We want to improve - and you can help!  You may receive a survey in the mail.  This is your opportunity to tell us what excellent service you received, and where we could use improvement.  We value your input!     Interested in decreasing your wait time in the Urgent Care Clinic?  Same day reservations can now be made on line.  Go to [email or web address] to see the wait times at each [facility] and to make a reservation at the site that is most convenient for you. We will do our best to honor your reservation time but please understand that wait times are subject to change once you arrive at the clinic.    Thank you again [facility] visiting [facility].  Nathaniel Berry PA-C

## 2020-03-07 NOTE — ED NOTES
Received call from [facility] from [facility] states, \" The patient may not be accepted due to CIWA

## 2020-03-07 NOTE — ED NOTES
Bedside and Verbal shift change report given to Eldon Shelton (oncoming nurse) by Maulik Lyons RN (offgoing nurse). Report included the following information SBAR, Kardex and ED Summary.

## 2020-03-07 NOTE — ED NOTES
I have been aware of this patient since my shift yesterday. Dr. Viviane Freire sign this patient out to the morning physician, Asia Santamaria. We are still waiting for placement for the patient for alcohol rehab. An update from Ingrid, Irina Scott. She states the patient no longer meets criteria for inpatient alcohol rehab at [facility]. She gave the patient multiple resources for rehab and counseling. The patient is stable for discharge. He has been ambulatory and is alert and oriented x3. He denies any suicidal homicidal ideations. He has no IV. I personally discharged the patient and gave him his paperwork. A cab was called for the patient.     Capo Dougherty,

## 2020-04-15 DIAGNOSIS — E78.00 HYPERCHOLESTEROLEMIA: ICD-10-CM

## 2020-04-15 DIAGNOSIS — E11.9 DIABETES MELLITUS WITHOUT COMPLICATION (HCC): ICD-10-CM

## 2020-04-15 DIAGNOSIS — I10 ESSENTIAL HYPERTENSION: ICD-10-CM

## 2020-04-15 NOTE — LETTER
4/20/2020 10:42 AM 
 
Mr. Mcrad Sherry [address] Dear Mr. Schroedera Ariel: 
 
Werner Hancock missed you! Please call our office at [phone] and schedule a virtual follow up appointment for your continued care and medication refills.  
 
 
 
Sincerely, 
 
 
Deejay Hines LPN

## 2020-04-16 RX ORDER — ATORVASTATIN CALCIUM 20 MG/1
TABLET, FILM COATED ORAL
Qty: 30 TAB | Refills: 5 | OUTPATIENT
Start: 2020-04-16

## 2020-04-16 RX ORDER — LOSARTAN POTASSIUM 100 MG/1
TABLET ORAL
Qty: 30 TAB | Refills: 5 | OUTPATIENT
Start: 2020-04-16

## 2020-04-16 RX ORDER — GLIPIZIDE 5 MG/1
TABLET ORAL
Qty: 30 TAB | Refills: 5 | OUTPATIENT
Start: 2020-04-16

## 2020-05-06 DIAGNOSIS — E11.9 DIABETES MELLITUS WITHOUT COMPLICATION (HCC): ICD-10-CM

## 2020-05-06 RX ORDER — GLIPIZIDE 5 MG/1
TABLET ORAL
Qty: 30 TAB | Refills: 5 | OUTPATIENT
Start: 2020-05-06

## 2020-11-15 DIAGNOSIS — E11.9 DIABETES MELLITUS WITHOUT COMPLICATION (HCC): ICD-10-CM

## 2020-11-15 NOTE — LETTER
11/16/2020 2:09 PM 
 
Mr. John Sal [id number] [address] Dear Kinza Julia Alexi: 
 
 
Johnny Ervin missed you! We have been trying to reach you but have been unsuccessful. Please call our office at [phone] and schedule a follow up appointment for your continued care and medication refills. Sincerely, Janet Avila MD

## 2020-11-16 RX ORDER — GLIPIZIDE 5 MG/1
TABLET ORAL
Qty: 30 TAB | Refills: 5 | OUTPATIENT
Start: 2020-11-16

## 2020-11-16 NOTE — TELEPHONE ENCOUNTER
Attempted to contact patient at [phone] (home) but recording states that patient's voicemail box has not been set up. Attempted to contact patient at alternate # [phone] but this number is not in service. Contacted phone number listed for patient's mother [phone] but male answered the phone and stated \"I have no idea who she is. \"    Another letter has been sent to patient.

## 2021-09-04 NOTE — PROGRESS NOTES
SUBJECTIVE  Chief Complaint   Patient presents with    Cholesterol Problem    Chronic Kidney Disease    Fatty Liver    Hypertension     Patient presents for follow-up on their diabetes and high cholesterol. He has no complaints with respect to diabetes. He also has a history of fatty liver. He has no myalgias or cramping. He has been taking glipizide twice daily without any signs or symptoms of hypoglycemia. We also reviewed their cardiac risk factors. The patient is still not checking home blood sugars. He has had an annual eye exam earlier this year but since then has had some blurry vision that has since resolved. He would like to go back to get checked out. Denies any polyuria, polydipsia, or polyphagia. Denies any weight loss. His psychiatrist has him on Depakote and haldol. He is compliant with his meds without side effects aside from a tremor. ROS:  History obtained from the patient  · General: negative for - chills, fever, weight changes or malaise  · Respiratory: no cough, shortness of breath, or wheezing  · Neurological: no tingling, headache or dizziness. · :  No longer having excessive urination at night    OBJECTIVE  Blood pressure 122/78, pulse 73, temperature 98 °F (36.7 °C), temperature source Oral, resp. rate 16, height 6' (1.829 m), weight 256 lb (116.1 kg), SpO2 98 %. General:  alert, cooperative, well appearing, in no apparent distress. CV:  The heart sounds are regular in rate and rhythm. There is a normal S1 and S2. There or no murmurs. Lungs: Inspiratory and expiratory efforts are full and unlabored. Lung sounds are clear and equal to auscultation throughout all lung fields without wheezing, rales, or rhonchi. GI:  The abdomen is soft and nontender. There is no hepatosplenomegaly or other masses. There is no rebound or guarding. Extremities:  No swelling. No pedal edema. Psych: normal affect. Mood good. Oriented x 3.   Judgement and insight intact. Results for Padimni Cherry (MRN [id number]) as of 9/17/2018 10:50   Ref. Range 9/10/2018 00:00   WBC Latest Ref Range: 3.4 - 10.8 x10E3/uL 5.0   RBC Latest Ref Range: 4.14 - 5.80 x10E6/uL 4.54   HGB Latest Ref Range: 13.0 - 17.7 g/dL 12.7 (L)   HCT Latest Ref Range: 37.5 - 51.0 % 39.5   MCV Latest Ref Range: 79 - 97 fL 87   MCH Latest Ref Range: 26.6 - 33.0 pg 28.0   MCHC Latest Ref Range: 31.5 - 35.7 g/dL 32.2   RDW Latest Ref Range: 12.3 - 15.4 % 15.2   PLATELET Latest Ref Range: 150 - 379 x10E3/uL 223   Sodium Latest Ref Range: 134 - 144 mmol/L 144   Potassium Latest Ref Range: 3.5 - 5.2 mmol/L 4.2   Chloride Latest Ref Range: 96 - 106 mmol/L 103   CO2 Latest Ref Range: 20 - 29 mmol/L 24   Glucose Latest Ref Range: 65 - 99 mg/dL 87   BUN Latest Ref Range: 6 - 24 mg/dL 14   Creatinine Latest Ref Range: 0.76 - 1.27 mg/dL 1.31 (H)   BUN/Creatinine ratio Latest Ref Range: 9 - 20  11   Calcium Latest Ref Range: 8.7 - 10.2 mg/dL 9.8   GFR est non-AA Latest Ref Range: >59 mL/min/1.73 68   GFR est AA Latest Ref Range: >59 mL/min/1.73 78   Bilirubin, total Latest Ref Range: 0.0 - 1.2 mg/dL 0.3   Protein, total Latest Ref Range: 6.0 - 8.5 g/dL 7.4   Albumin Latest Ref Range: 3.5 - 5.5 g/dL 4.3   A-G Ratio Latest Ref Range: 1.2 - 2.2  1.4   ALT (SGPT) Latest Ref Range: 0 - 44 IU/L 22   AST Latest Ref Range: 0 - 40 IU/L 23   Alk.  phosphatase Latest Ref Range: 39 - 117 IU/L 69   Triglyceride Latest Ref Range: 0 - 149 mg/dL 136   Cholesterol, total Latest Ref Range: 100 - 199 mg/dL 174   HDL Cholesterol Latest Ref Range: >39 mg/dL 55   LDL, calculated Latest Ref Range: 0 - 99 mg/dL 92   VLDL, calculated Latest Ref Range: 5 - 40 mg/dL 27   Hemoglobin A1c, (calculated) Latest Ref Range: 4.8 - 5.6 % 6.0 (H)   Estimated average glucose Latest Units: mg/dL 126   Creatinine, urine Latest Ref Range: Not Estab. mg/dL 233.7   Microalbumin, urine Latest Ref Range: Not Estab. ug/mL <3.0     ASSESSMENT / PLAN    ICD-10-CM ICD-9-CM    1. Diabetes mellitus without complication (HCC) L82.8 250.00 REFERRAL TO OPTOMETRY      glipiZIDE (GLUCOTROL) 5 mg tablet   2. Chronic kidney disease (CKD), stage II (mild) N18.2 585.2    3. Essential hypertension I10 401.9 losartan (COZAAR) 100 mg tablet   4. Hypercholesterolemia E78.00 272.0 atorvastatin (LIPITOR) 20 mg tablet   5. Fatty liver K76.0 571.8    6. Chronic paranoid schizophrenia (HCC) F20.0 295.32    7. Class 1 obesity with serious comorbidity in adult, unspecified BMI, unspecified obesity type E66.9 278.00    8. Encounter for immunization Z23 V03.89 MA IMMUNIZ ADMIN,1 SINGLE/COMB VAC/TOXOID      INFLUENZA VIRUS VAC QUAD,SPLIT,PRESV FREE SYRINGE IM     Reviewed labs - no hepatotoxicity. Diabetes -   Continue current care. Refills as needed. He will check his feet daily and is reminded on annual eye exams. We will get him back sooner to see his eye doctor due to his recent symptoms. HTN with CKD - renal function is normal.  HTN is well controlled. He will continue losartan. Hypercholesterolemia - controlled. Cont Lipitor 20mg nightly. Obesity / Fatty liver - avoid alcohol. Diet and exercise. Schizophrenia - continue follow-up with psychiatry. Meds reviewed. Flu vaccine administered. All chart history elements were reviewed by me at the time of the visit even though marked at time of note closure. Patient understands our medical plan. Patient has provided input and agrees with goals. Alternatives have been explained and offered. All questions answered. The patient is to call if condition worsens or fails to improve. Follow-up Disposition:  Return in about 6 months (around 3/17/2019) for  routine care and POC A1C in office. Please discuss with  dental insurance concerns. DC instructions

## 2022-03-19 PROBLEM — E66.9 OBESITY: Status: ACTIVE | Noted: 2017-08-28

## 2022-08-12 ENCOUNTER — HOSPITAL ENCOUNTER (EMERGENCY)
Age: 42
Discharge: LEFT BEFORE DISCHARGE | End: 2022-08-13
Attending: EMERGENCY MEDICINE | Admitting: EMERGENCY MEDICINE
Payer: MEDICAID

## 2022-08-12 VITALS
HEART RATE: 78 BPM | BODY MASS INDEX: 27.77 KG/M2 | TEMPERATURE: 97 F | HEIGHT: 72 IN | WEIGHT: 205 LBS | RESPIRATION RATE: 16 BRPM | DIASTOLIC BLOOD PRESSURE: 93 MMHG | OXYGEN SATURATION: 98 % | SYSTOLIC BLOOD PRESSURE: 139 MMHG

## 2022-08-12 DIAGNOSIS — R10.13 DYSPEPSIA: Primary | ICD-10-CM

## 2022-08-12 LAB
ANION GAP SERPL CALC-SCNC: 5 MMOL/L (ref 3–18)
APPEARANCE UR: CLEAR
BASOPHILS # BLD: 0 K/UL (ref 0–0.1)
BASOPHILS NFR BLD: 1 % (ref 0–2)
BILIRUB UR QL: NEGATIVE
BUN SERPL-MCNC: 18 MG/DL (ref 7–18)
BUN/CREAT SERPL: 15 (ref 12–20)
CALCIUM SERPL-MCNC: 9.4 MG/DL (ref 8.5–10.1)
CHLORIDE SERPL-SCNC: 109 MMOL/L (ref 100–111)
CO2 SERPL-SCNC: 27 MMOL/L (ref 21–32)
COLOR UR: YELLOW
CREAT SERPL-MCNC: 1.19 MG/DL (ref 0.6–1.3)
DIFFERENTIAL METHOD BLD: ABNORMAL
EOSINOPHIL # BLD: 0 K/UL (ref 0–0.4)
EOSINOPHIL NFR BLD: 1 % (ref 0–5)
ERYTHROCYTE [DISTWIDTH] IN BLOOD BY AUTOMATED COUNT: 14.4 % (ref 11.6–14.5)
GLUCOSE SERPL-MCNC: 132 MG/DL (ref 74–99)
GLUCOSE UR STRIP.AUTO-MCNC: NEGATIVE MG/DL
HCT VFR BLD AUTO: 40.4 % (ref 36–48)
HGB BLD-MCNC: 12.9 G/DL (ref 13–16)
HGB UR QL STRIP: NEGATIVE
IMM GRANULOCYTES # BLD AUTO: 0 K/UL (ref 0–0.04)
IMM GRANULOCYTES NFR BLD AUTO: 1 % (ref 0–0.5)
KETONES UR QL STRIP.AUTO: NEGATIVE MG/DL
LEUKOCYTE ESTERASE UR QL STRIP.AUTO: NEGATIVE
LYMPHOCYTES # BLD: 1.9 K/UL (ref 0.9–3.6)
LYMPHOCYTES NFR BLD: 30 % (ref 21–52)
MCH RBC QN AUTO: 26.7 PG (ref 24–34)
MCHC RBC AUTO-ENTMCNC: 31.9 G/DL (ref 31–37)
MCV RBC AUTO: 83.6 FL (ref 78–100)
MONOCYTES # BLD: 0.6 K/UL (ref 0.05–1.2)
MONOCYTES NFR BLD: 10 % (ref 3–10)
NEUTS SEG # BLD: 3.7 K/UL (ref 1.8–8)
NEUTS SEG NFR BLD: 59 % (ref 40–73)
NITRITE UR QL STRIP.AUTO: NEGATIVE
NRBC # BLD: 0 K/UL (ref 0–0.01)
NRBC BLD-RTO: 0 PER 100 WBC
PH UR STRIP: 7 [PH] (ref 5–8)
PLATELET # BLD AUTO: 173 K/UL (ref 135–420)
PMV BLD AUTO: 11.2 FL (ref 9.2–11.8)
POTASSIUM SERPL-SCNC: 4 MMOL/L (ref 3.5–5.5)
PROT UR STRIP-MCNC: NEGATIVE MG/DL
RBC # BLD AUTO: 4.83 M/UL (ref 4.35–5.65)
SODIUM SERPL-SCNC: 141 MMOL/L (ref 136–145)
SP GR UR REFRACTOMETRY: 1.01 (ref 1–1.03)
UROBILINOGEN UR QL STRIP.AUTO: 0.2 EU/DL (ref 0.2–1)
WBC # BLD AUTO: 6.3 K/UL (ref 4.6–13.2)

## 2022-08-12 PROCEDURE — 99283 EMERGENCY DEPT VISIT LOW MDM: CPT | Performed by: EMERGENCY MEDICINE

## 2022-08-12 PROCEDURE — 85025 COMPLETE CBC W/AUTO DIFF WBC: CPT

## 2022-08-12 PROCEDURE — 81003 URINALYSIS AUTO W/O SCOPE: CPT

## 2022-08-12 PROCEDURE — 80048 BASIC METABOLIC PNL TOTAL CA: CPT

## 2022-08-12 PROCEDURE — 74011250637 HC RX REV CODE- 250/637: Performed by: EMERGENCY MEDICINE

## 2022-08-12 RX ADMIN — ALUMINUM HYDROXIDE AND MAGNESIUM HYDROXIDE 30 ML: 200; 200 SUSPENSION ORAL at 22:18

## 2022-08-12 NOTE — ED TRIAGE NOTES
Patient arrives ambulatory to triage c/o abdominal pain, weakness and lightheaded starting Wednesday.

## 2022-11-10 ENCOUNTER — HOSPITAL ENCOUNTER (EMERGENCY)
Age: 42
Discharge: HOME | End: 2022-11-10
Attending: EMERGENCY MEDICINE
Payer: MEDICAID

## 2022-11-10 ENCOUNTER — APPOINTMENT (OUTPATIENT)
Dept: CT IMAGING | Age: 42
End: 2022-11-10
Attending: PHYSICIAN ASSISTANT
Payer: MEDICAID

## 2022-11-10 VITALS
OXYGEN SATURATION: 99 % | RESPIRATION RATE: 16 BRPM | DIASTOLIC BLOOD PRESSURE: 97 MMHG | TEMPERATURE: 98.6 F | HEART RATE: 99 BPM | SYSTOLIC BLOOD PRESSURE: 129 MMHG

## 2022-11-10 DIAGNOSIS — R10.84 ABDOMINAL PAIN, GENERALIZED: Primary | ICD-10-CM

## 2022-11-10 LAB
ALBUMIN SERPL-MCNC: 3.9 G/DL (ref 3.4–5)
ALBUMIN/GLOB SERPL: 0.9 {RATIO} (ref 0.8–1.7)
ALP SERPL-CCNC: 97 U/L (ref 45–117)
ALT SERPL-CCNC: 39 U/L (ref 16–61)
AMPHET UR QL SCN: NEGATIVE
ANION GAP SERPL CALC-SCNC: 4 MMOL/L (ref 3–18)
APPEARANCE UR: CLEAR
AST SERPL-CCNC: 33 U/L (ref 10–38)
ATRIAL RATE: 92 BPM
BARBITURATES UR QL SCN: NEGATIVE
BASOPHILS # BLD: 0.1 K/UL (ref 0–0.1)
BASOPHILS NFR BLD: 1 % (ref 0–2)
BENZODIAZ UR QL: NEGATIVE
BILIRUB SERPL-MCNC: 0.2 MG/DL (ref 0.2–1)
BILIRUB UR QL: NEGATIVE
BUN SERPL-MCNC: 14 MG/DL (ref 7–18)
BUN/CREAT SERPL: 11 (ref 12–20)
CALCIUM SERPL-MCNC: 9.6 MG/DL (ref 8.5–10.1)
CALCULATED P AXIS, ECG09: 65 DEGREES
CALCULATED R AXIS, ECG10: 50 DEGREES
CALCULATED T AXIS, ECG11: 48 DEGREES
CANNABINOIDS UR QL SCN: NEGATIVE
CHLORIDE SERPL-SCNC: 106 MMOL/L (ref 100–111)
CO2 SERPL-SCNC: 27 MMOL/L (ref 21–32)
COCAINE UR QL SCN: NEGATIVE
COLOR UR: YELLOW
CREAT SERPL-MCNC: 1.32 MG/DL (ref 0.6–1.3)
DIAGNOSIS, 93000: NORMAL
DIFFERENTIAL METHOD BLD: ABNORMAL
EOSINOPHIL # BLD: 0.2 K/UL (ref 0–0.4)
EOSINOPHIL NFR BLD: 3 % (ref 0–5)
ERYTHROCYTE [DISTWIDTH] IN BLOOD BY AUTOMATED COUNT: 15.4 % (ref 11.6–14.5)
ETHANOL SERPL-MCNC: <3 MG/DL (ref 0–3)
GLOBULIN SER CALC-MCNC: 4.4 G/DL (ref 2–4)
GLUCOSE SERPL-MCNC: 106 MG/DL (ref 74–99)
GLUCOSE UR STRIP.AUTO-MCNC: NEGATIVE MG/DL
HCT VFR BLD AUTO: 43.7 % (ref 36–48)
HDSCOM,HDSCOM: NORMAL
HGB BLD-MCNC: 14.1 G/DL (ref 13–16)
HGB UR QL STRIP: NEGATIVE
IMM GRANULOCYTES # BLD AUTO: 0 K/UL (ref 0–0.04)
IMM GRANULOCYTES NFR BLD AUTO: 1 % (ref 0–0.5)
KETONES UR QL STRIP.AUTO: NEGATIVE MG/DL
LACTATE BLD-SCNC: 1.49 MMOL/L (ref 0.4–2)
LEUKOCYTE ESTERASE UR QL STRIP.AUTO: NEGATIVE
LIPASE SERPL-CCNC: 149 U/L (ref 73–393)
LYMPHOCYTES # BLD: 2.2 K/UL (ref 0.9–3.6)
LYMPHOCYTES NFR BLD: 34 % (ref 21–52)
MCH RBC QN AUTO: 26.5 PG (ref 24–34)
MCHC RBC AUTO-ENTMCNC: 32.3 G/DL (ref 31–37)
MCV RBC AUTO: 82 FL (ref 78–100)
METHADONE UR QL: NEGATIVE
MONOCYTES # BLD: 0.6 K/UL (ref 0.05–1.2)
MONOCYTES NFR BLD: 9 % (ref 3–10)
NEUTS SEG # BLD: 3.4 K/UL (ref 1.8–8)
NEUTS SEG NFR BLD: 53 % (ref 40–73)
NITRITE UR QL STRIP.AUTO: NEGATIVE
NRBC # BLD: 0 K/UL (ref 0–0.01)
NRBC BLD-RTO: 0 PER 100 WBC
OPIATES UR QL: NEGATIVE
P-R INTERVAL, ECG05: 144 MS
PCP UR QL: NEGATIVE
PH UR STRIP: 7 [PH] (ref 5–8)
PLATELET # BLD AUTO: 227 K/UL (ref 135–420)
PMV BLD AUTO: 9.8 FL (ref 9.2–11.8)
POTASSIUM SERPL-SCNC: 3.8 MMOL/L (ref 3.5–5.5)
PROT SERPL-MCNC: 8.3 G/DL (ref 6.4–8.2)
PROT UR STRIP-MCNC: NEGATIVE MG/DL
Q-T INTERVAL, ECG07: 348 MS
QRS DURATION, ECG06: 94 MS
QTC CALCULATION (BEZET), ECG08: 430 MS
RBC # BLD AUTO: 5.33 M/UL (ref 4.35–5.65)
SODIUM SERPL-SCNC: 137 MMOL/L (ref 136–145)
SP GR UR REFRACTOMETRY: 1.01 (ref 1–1.03)
UROBILINOGEN UR QL STRIP.AUTO: 1 EU/DL (ref 0.2–1)
VENTRICULAR RATE, ECG03: 92 BPM
WBC # BLD AUTO: 6.4 K/UL (ref 4.6–13.2)

## 2022-11-10 PROCEDURE — 74011250636 HC RX REV CODE- 250/636: Performed by: PHYSICIAN ASSISTANT

## 2022-11-10 PROCEDURE — 83605 ASSAY OF LACTIC ACID: CPT

## 2022-11-10 PROCEDURE — 82077 ASSAY SPEC XCP UR&BREATH IA: CPT

## 2022-11-10 PROCEDURE — 96374 THER/PROPH/DIAG INJ IV PUSH: CPT

## 2022-11-10 PROCEDURE — 85025 COMPLETE CBC W/AUTO DIFF WBC: CPT

## 2022-11-10 PROCEDURE — 81003 URINALYSIS AUTO W/O SCOPE: CPT

## 2022-11-10 PROCEDURE — 96375 TX/PRO/DX INJ NEW DRUG ADDON: CPT

## 2022-11-10 PROCEDURE — 99285 EMERGENCY DEPT VISIT HI MDM: CPT

## 2022-11-10 PROCEDURE — 80307 DRUG TEST PRSMV CHEM ANLYZR: CPT

## 2022-11-10 PROCEDURE — 74177 CT ABD & PELVIS W/CONTRAST: CPT

## 2022-11-10 PROCEDURE — 93005 ELECTROCARDIOGRAM TRACING: CPT

## 2022-11-10 PROCEDURE — 80053 COMPREHEN METABOLIC PANEL: CPT

## 2022-11-10 PROCEDURE — 83690 ASSAY OF LIPASE: CPT

## 2022-11-10 PROCEDURE — 74011000636 HC RX REV CODE- 636: Performed by: EMERGENCY MEDICINE

## 2022-11-10 RX ORDER — ONDANSETRON 2 MG/ML
4 INJECTION INTRAMUSCULAR; INTRAVENOUS
Status: COMPLETED | OUTPATIENT
Start: 2022-11-10 | End: 2022-11-10

## 2022-11-10 RX ORDER — MORPHINE SULFATE 4 MG/ML
4 INJECTION INTRAVENOUS
Status: COMPLETED | OUTPATIENT
Start: 2022-11-10 | End: 2022-11-10

## 2022-11-10 RX ADMIN — SODIUM CHLORIDE 1000 ML: 900 INJECTION, SOLUTION INTRAVENOUS at 11:38

## 2022-11-10 RX ADMIN — ONDANSETRON 4 MG: 2 INJECTION INTRAMUSCULAR; INTRAVENOUS at 11:38

## 2022-11-10 RX ADMIN — IOPAMIDOL 100 ML: 612 INJECTION, SOLUTION INTRAVENOUS at 11:20

## 2022-11-10 RX ADMIN — MORPHINE SULFATE 4 MG: 4 INJECTION, SOLUTION INTRAMUSCULAR; INTRAVENOUS at 11:37

## 2022-11-10 NOTE — ED NOTES
1:04 PM  11/10/22     Discharge instructions given to patient (name) with verbalization of understanding. Patient accompanied by no one. Patient discharged with the following prescriptions none. Patient discharged to home (destination).       Latanya Brown      In [facility] waiting for [place]

## 2022-11-10 NOTE — Clinical Note
[address] [place] [address] [facility] EMERGENCY DEPT  [id number] [address] [phone]    Work/School Note    Date: 11/10/2022    To Whom It May concern:    Vickie Valente was seen and treated today in the emergency room by the following provider(s):  Attending Provider: Nery Mariee MD  Physician Assistant: Pravin Judge. Vickie Valente is excused from work/school on 11/10/22 and 11/11/22. He is medically clear to return to work/school on 11/12/2022.        Sincerely,          Mahin Liao PA

## 2022-11-10 NOTE — ED PROVIDER NOTES
EMERGENCY DEPARTMENT HISTORY AND PHYSICAL EXAM    3:26 PM      Date: 11/10/2022  Patient Name: Ceferino Gilman    History of Presenting Illness     Chief Complaint   Patient presents with    Abdominal Pain         History Provided By: Patient    Additional History (Context): Ceferino Gilman is a 38 y.o. male with  hx of DM, PTSD, schizophrenia, and other noted PMH  who presents with complaint of diffuse abdominal pain x 1 week. Patient notes associated nausea and decreased appetite. Patient denies fever chills, chest pain, shortness of breath, vomiting, diarrhea, dysuria, hematuria. Patient notes he has been seen for pancreatitis in the past, but refused admission in the past.  Patient did not take any medication for symptoms prior to arrival.  Denies exacerbating or alleviating factors      PCP: Paula Gutierrez MD    Current Outpatient Medications   Medication Sig Dispense Refill    losartan (COZAAR) 100 mg tablet take 1 tablet by mouth daily 30 Tab 5    glipiZIDE (GLUCOTROL) 5 mg tablet take 1/2 tablet by mouth twice a day 30 Tab 5    atorvastatin (LIPITOR) 20 mg tablet take 1 tablet by mouth once daily 30 Tab 5    iron, carbonyl (FEOSOL) 45 mg tab Take 1 Tab by mouth every other day. 15 Tab 5    haloperidol lactate (HALDOL) 5 mg/mL injection by IntraMUSCular route every month.      benztropine (COGENTIN) 2 mg tablet Take 4 mg by mouth two (2) times a day.       divalproex DR (DEPAKOTE) 500 mg tablet 1 tab qam and 2 tabs qhs  0       Past History     Past Medical History:  Past Medical History:   Diagnosis Date    Crossed renal ectopia     sees nephrology    Diabetes mellitus ([facility] [place] [address]     Embolism - blood clot 2008    right thigh    GERD (gastroesophageal reflux disease) 2011    Hematuria 2012    History of DVT (deep vein thrombosis) 2007    Hypertension     PTSD (post-traumatic stress disorder)     Sarcoidosis     Schizophrenia, paranoid type ([facility] [place] [address]        Past Surgical History:  No past surgical history on file.    Family History:  Family History   Problem Relation Age of Onset    Alcohol abuse Mother     Hypertension Mother     Depression Father     Asthma Brother     Alcohol abuse Maternal Uncle     Alcohol abuse Paternal Uncle     Diabetes Maternal Grandmother     Depression Maternal Aunt        Social History:  Social History     Tobacco Use    Smoking status: Former     Packs/day: 0.10     Years: 15.00     Pack years: 1.50     Types: Cigars, Cigarettes    Smokeless tobacco: Never    Tobacco comments:     Black and Milds - 4   Substance Use Topics    Alcohol use: Yes     Alcohol/week: 2.5 standard drinks     Types: 3 Cans of beer per week     Comment: rare use    Drug use: Yes     Frequency: 3.0 times per week     Types: Marijuana       Allergies: Allergies   Allergen Reactions    Norvasc [Amlodipine] Swelling     Leg swelling         Review of Systems       Review of Systems   Constitutional:  Positive for appetite change. Negative for chills and fever. Respiratory:  Negative for shortness of breath. Cardiovascular:  Negative for chest pain. Gastrointestinal:  Positive for abdominal pain and nausea. Negative for vomiting. Skin:  Negative for rash. Neurological:  Negative for weakness. All other systems reviewed and are negative. Physical Exam   Visit Vitals  BP (!) 129/97   Pulse 99   Temp 98.6 °F (37 °C)   Resp 16   SpO2 99%         Physical Exam  Vitals and nursing note reviewed. Constitutional:       General: He is not in acute distress. Appearance: He is well-developed. He is obese. He is not ill-appearing, toxic-appearing or diaphoretic. HENT:      Head: Normocephalic and atraumatic. Cardiovascular:      Rate and Rhythm: Normal rate and regular rhythm. Heart sounds: Normal heart sounds. No murmur heard. No friction rub. No gallop. Pulmonary:      Effort: Pulmonary effort is normal. No respiratory distress. Breath sounds: Normal breath sounds. No wheezing or rales. Abdominal:      General: Abdomen is flat. Bowel sounds are normal.      Palpations: Abdomen is soft. Tenderness: There is no abdominal tenderness. There is no right CVA tenderness, left CVA tenderness, guarding or rebound. Negative signs include Li's sign. Musculoskeletal:         General: Normal range of motion. Cervical back: Normal range of motion and neck supple. Skin:     General: Skin is warm. Findings: No rash. Neurological:      Mental Status: He is alert. Diagnostic Study Results     Labs -  Recent Results (from the past 12 hour(s))   CBC WITH AUTOMATED DIFF    Collection Time: 11/10/22  9:42 AM   Result Value Ref Range    WBC 6.4 4.6 - 13.2 K/uL    RBC 5.33 4.35 - 5.65 M/uL    HGB 14.1 13.0 - 16.0 g/dL    HCT 43.7 36.0 - 48.0 %    MCV 82.0 78.0 - 100.0 FL    MCH 26.5 24.0 - 34.0 PG    MCHC 32.3 31.0 - 37.0 g/dL    RDW 15.4 (H) 11.6 - 14.5 %    PLATELET 772 821 - 276 K/uL    MPV 9.8 9.2 - 11.8 FL    NRBC 0.0 0  WBC    ABSOLUTE NRBC 0.00 0.00 - 0.01 K/uL    NEUTROPHILS 53 40 - 73 %    LYMPHOCYTES 34 21 - 52 %    MONOCYTES 9 3 - 10 %    EOSINOPHILS 3 0 - 5 %    BASOPHILS 1 0 - 2 %    IMMATURE GRANULOCYTES 1 (H) 0.0 - 0.5 %    ABS. NEUTROPHILS 3.4 1.8 - 8.0 K/UL    ABS. LYMPHOCYTES 2.2 0.9 - 3.6 K/UL    ABS. MONOCYTES 0.6 0.05 - 1.2 K/UL    ABS. EOSINOPHILS 0.2 0.0 - 0.4 K/UL    ABS. BASOPHILS 0.1 0.0 - 0.1 K/UL    ABS. IMM.  GRANS. 0.0 0.00 - 0.04 K/UL    DF AUTOMATED     METABOLIC PANEL, COMPREHENSIVE    Collection Time: 11/10/22  9:42 AM   Result Value Ref Range    Sodium 137 136 - 145 mmol/L    Potassium 3.8 3.5 - 5.5 mmol/L    Chloride 106 100 - 111 mmol/L    CO2 27 21 - 32 mmol/L    Anion gap 4 3.0 - 18 mmol/L    Glucose 106 (H) 74 - 99 mg/dL    BUN 14 7.0 - 18 MG/DL    Creatinine 1.32 (H) 0.6 - 1.3 MG/DL    BUN/Creatinine ratio 11 (L) 12 - 20      eGFR >60 >60 ml/min/1.73m2    Calcium 9.6 8.5 - 10.1 MG/DL    Bilirubin, total 0.2 0.2 - 1.0 MG/DL    ALT (SGPT) 39 16 - 61 U/L    AST (SGOT) 33 10 - 38 U/L    Alk.  phosphatase 97 45 - 117 U/L    Protein, total 8.3 (H) 6.4 - 8.2 g/dL    Albumin 3.9 3.4 - 5.0 g/dL    Globulin 4.4 (H) 2.0 - 4.0 g/dL    A-G Ratio 0.9 0.8 - 1.7     LIPASE    Collection Time: 11/10/22  9:42 AM   Result Value Ref Range    Lipase 149 73 - 393 U/L   URINALYSIS W/ RFLX MICROSCOPIC    Collection Time: 11/10/22  9:42 AM   Result Value Ref Range    Color YELLOW      Appearance CLEAR      Specific gravity 1.011 1.005 - 1.030      pH (UA) 7.0 5.0 - 8.0      Protein Negative NEG mg/dL    Glucose Negative NEG mg/dL    Ketone Negative NEG mg/dL    Bilirubin Negative NEG      Blood Negative NEG      Urobilinogen 1.0 0.2 - 1.0 EU/dL    Nitrites Negative NEG      Leukocyte Esterase Negative NEG     ETHYL ALCOHOL    Collection Time: 11/10/22  9:42 AM   Result Value Ref Range    ALCOHOL(ETHYL),SERUM <3 0 - 3 MG/DL   DRUG SCREEN, URINE    Collection Time: 11/10/22  9:42 AM   Result Value Ref Range    BENZODIAZEPINES Negative NEG      BARBITURATES Negative NEG      THC (TH-CANNABINOL) Negative NEG      OPIATES Negative NEG      PCP(PHENCYCLIDINE) Negative NEG      COCAINE Negative NEG      AMPHETAMINES Negative NEG      METHADONE Negative NEG      HDSCOM (NOTE)    EKG, 12 LEAD, INITIAL    Collection Time: 11/10/22 11:01 AM   Result Value Ref Range    Ventricular Rate 92 BPM    Atrial Rate 92 BPM    P-R Interval 144 ms    QRS Duration 94 ms    Q-T Interval 348 ms    QTC Calculation (Bezet) 430 ms    Calculated P Axis 65 degrees    Calculated R Axis 50 degrees    Calculated T Axis 48 degrees    Diagnosis       Normal sinus rhythm  Minimal voltage criteria for LVH, may be normal variant ( Sokolow-Chacon )  Borderline ECG  No previous ECGs available  Confirmed by Peewee Wilkes MD, --- ([id number]) on 11/10/2022 6:09:37 PM     POC LACTIC ACID    Collection Time: 11/10/22 11:38 AM   Result Value Ref Range    Lactic Acid (POC) 1.49 0.40 - 2.00 mmol/L       Radiologic Studies -   CT ABD PELV W CONT   Final Result   1. No bowel distention to suggest obstruction. Nonenlarged appendix. No   diverticular disease. 2. Apparent congenital variant low lying right kidney, otherwise unremarkable as   above. No hydronephrosis or stones. Bladder underdistended but grossly   unremarkable. 3. Probable mild hepatic steatosis. No biliary ductal dilation. Medical Decision Making   I am the first provider for this patient. I reviewed the vital signs, available nursing notes, past medical history, past surgical history, family history and social history. Vital Signs-Reviewed the patient's vital signs. Records Reviewed: Nursing Notes, Old Medical Records, Previous electrocardiograms, Ambulance Run Sheet, Previous Radiology Studies, and Previous Laboratory Studies (Time of Review: 3:26 PM)    ED Course: Progress Notes, Reevaluation, and Consults:  12:45 PM Reviewed results and plan with patient. Discussed need for close outpatient follow-up this week for reassessment. Discussed strict return precautions, including fever, vomiting, or any other medical concerns. Provider Notes (Medical Decision Making): 15-year-old male who presents the ED due to diffuse abdominal pain x1 week. Afebrile, nontoxic-appearing, looks well. Abdomen soft and nontender to palpation. Labs essentially unremarkable including lipase. CT abdomen pelvis without evidence of acute process. Patient is stable for discharge with symptomatic management, close outpatient follow-up for further assessment. Strict return precautions provided    Diagnosis     Clinical Impression:   1.  Abdominal pain, generalized        Disposition: home     Follow-up Information       Follow up With Specialties Details Why [address] [facility] EMERGENCY DEPT Emergency Medicine  If symptoms worsen [address] [place] [id number]  [address]  Schedule an appointment as soon as possible for a visit [address]  [address] [address]             Discharge Medication List as of 11/10/2022 12:45 PM        CONTINUE these medications which have NOT CHANGED    Details   losartan (COZAAR) 100 mg tablet take 1 tablet by mouth daily, Normal, Disp-30 Tab, R-5      glipiZIDE (GLUCOTROL) 5 mg tablet take 1/2 tablet by mouth twice a day, Normal, Disp-30 Tab, R-5      atorvastatin (LIPITOR) 20 mg tablet take 1 tablet by mouth once daily, Normal, Disp-30 Tab, R-5      iron, carbonyl (FEOSOL) 45 mg tab Take 1 Tab by mouth every other day., Normal, Disp-15 Tab, R-5      haloperidol lactate (HALDOL) 5 mg/mL injection by IntraMUSCular route every month., Historical Med      benztropine (COGENTIN) 2 mg tablet Take 4 mg by mouth two (2) times a day., Historical Med      divalproex DR (DEPAKOTE) 500 mg tablet 1 tab qam and 2 tabs qhs, Historical Med, R-0             Dictation disclaimer:  Please note that this dictation was completed with [employer] the [employer] voice recognition software. Quite often unanticipated grammatical, syntax, homophones, and other interpretive errors are inadvertently transcribed by the computer software. Please disregard these errors. Please excuse any errors that have escaped final proofreading.

## 2022-11-10 NOTE — Clinical Note
[address] [place] [address] Dr [facility] EMERGENCY DEPT  [id number] [address] [phone]    Work/School Note    Date: 11/10/2022    To Whom It May concern:    Heyward Goodell was seen and treated today in the emergency room by the following provider(s):  Attending Provider: Sushila Lee MD  Physician Assistant: Jose R Barker, [facility] Kam Fletcher. Heyward Goodell is excused from work/school on 11/10/22 and 11/11/22. He is medically clear to return to work/school on 11/12/2022.        Sincerely,          DELICIA Fang

## 2022-11-10 NOTE — DISCHARGE INSTRUCTIONS
Follow-up with your primary care physician within 2 days for reassessment. Bring the results from this visit with you for their review. Return to the ED immediately for any new, worsening, or persistent symptoms, including fever, vomiting, or any other medical concerns.

## 2022-11-10 NOTE — ED TRIAGE NOTES
Pt. Arrives to the ED endorsing diffuse abdominal pain. Pt. States he was seen at [facility] and dx with acute pancreatitis, however refused admission. Here for further evaluation of increased pain.

## 2023-03-31 ENCOUNTER — HOSPITAL ENCOUNTER (INPATIENT)
Facility: HOSPITAL | Age: 43
LOS: 1 days | Discharge: HOME | DRG: 750 | End: 2023-04-01
Attending: EMERGENCY MEDICINE | Admitting: PSYCHIATRY & NEUROLOGY
Payer: MEDICAID

## 2023-03-31 DIAGNOSIS — R45.851 SUICIDAL IDEATION: Primary | ICD-10-CM

## 2023-03-31 PROBLEM — F25.1 SCHIZOAFFECTIVE DISORDER, DEPRESSIVE TYPE (HCC): Status: ACTIVE | Noted: 2023-03-31

## 2023-03-31 LAB
ALBUMIN SERPL-MCNC: 3.8 G/DL (ref 3.4–5)
ALBUMIN/GLOB SERPL: 0.9 (ref 0.8–1.7)
ALP SERPL-CCNC: 83 U/L (ref 45–117)
ALT SERPL-CCNC: 31 U/L (ref 16–61)
AMPHET UR QL SCN: NEGATIVE
ANION GAP SERPL CALC-SCNC: 4 MMOL/L (ref 3–18)
APAP SERPL-MCNC: <2 UG/ML (ref 10–30)
AST SERPL-CCNC: 31 U/L (ref 10–38)
BARBITURATES UR QL SCN: NEGATIVE
BASOPHILS # BLD: 0.1 K/UL (ref 0–0.1)
BASOPHILS NFR BLD: 1 % (ref 0–2)
BENZODIAZ UR QL: NEGATIVE
BILIRUB SERPL-MCNC: 0.4 MG/DL (ref 0.2–1)
BUN SERPL-MCNC: 14 MG/DL (ref 7–18)
BUN/CREAT SERPL: 12 (ref 12–20)
CALCIUM SERPL-MCNC: 9.6 MG/DL (ref 8.5–10.1)
CANNABINOIDS UR QL SCN: NEGATIVE
CHLORIDE SERPL-SCNC: 105 MMOL/L (ref 100–111)
CO2 SERPL-SCNC: 28 MMOL/L (ref 21–32)
COCAINE UR QL SCN: NEGATIVE
CREAT SERPL-MCNC: 1.15 MG/DL (ref 0.6–1.3)
DIFFERENTIAL METHOD BLD: ABNORMAL
EOSINOPHIL # BLD: 0.1 K/UL (ref 0–0.4)
EOSINOPHIL NFR BLD: 2 % (ref 0–5)
ERYTHROCYTE [DISTWIDTH] IN BLOOD BY AUTOMATED COUNT: 14.8 % (ref 11.6–14.5)
ETHANOL SERPL-MCNC: <3 MG/DL (ref 0–3)
FLUAV RNA SPEC QL NAA+PROBE: NOT DETECTED
FLUBV RNA SPEC QL NAA+PROBE: NOT DETECTED
GLOBULIN SER CALC-MCNC: 4.2 G/DL (ref 2–4)
GLUCOSE BLD STRIP.AUTO-MCNC: 110 MG/DL (ref 70–110)
GLUCOSE SERPL-MCNC: 87 MG/DL (ref 74–99)
HCT VFR BLD AUTO: 46 % (ref 36–48)
HGB BLD-MCNC: 14.7 G/DL (ref 13–16)
IMM GRANULOCYTES # BLD AUTO: 0 K/UL (ref 0–0.04)
IMM GRANULOCYTES NFR BLD AUTO: 0 % (ref 0–0.5)
LYMPHOCYTES # BLD: 2.2 K/UL (ref 0.9–3.6)
LYMPHOCYTES NFR BLD: 33 % (ref 21–52)
Lab: NORMAL
MAGNESIUM SERPL-MCNC: 2.2 MG/DL (ref 1.6–2.6)
MCH RBC QN AUTO: 27 PG (ref 24–34)
MCHC RBC AUTO-ENTMCNC: 32 G/DL (ref 31–37)
MCV RBC AUTO: 84.6 FL (ref 78–100)
METHADONE UR QL: NEGATIVE
MONOCYTES # BLD: 0.5 K/UL (ref 0.05–1.2)
MONOCYTES NFR BLD: 8 % (ref 3–10)
NEUTS SEG # BLD: 3.8 K/UL (ref 1.8–8)
NEUTS SEG NFR BLD: 57 % (ref 40–73)
NRBC # BLD: 0 K/UL (ref 0–0.01)
NRBC BLD-RTO: 0 PER 100 WBC
OPIATES UR QL: NEGATIVE
PCP UR QL: NEGATIVE
PLATELET # BLD AUTO: 194 K/UL (ref 135–420)
PMV BLD AUTO: 9.9 FL (ref 9.2–11.8)
POTASSIUM SERPL-SCNC: 4.1 MMOL/L (ref 3.5–5.5)
PROT SERPL-MCNC: 8 G/DL (ref 6.4–8.2)
RBC # BLD AUTO: 5.44 M/UL (ref 4.35–5.65)
SALICYLATES SERPL-MCNC: 3.5 MG/DL (ref 2.8–20)
SARS-COV-2 RNA RESP QL NAA+PROBE: NOT DETECTED
SODIUM SERPL-SCNC: 137 MMOL/L (ref 136–145)
TSH SERPL DL<=0.05 MIU/L-ACNC: 1.8 UIU/ML (ref 0.36–3.74)
WBC # BLD AUTO: 6.6 K/UL (ref 4.6–13.2)

## 2023-03-31 PROCEDURE — 80143 DRUG ASSAY ACETAMINOPHEN: CPT

## 2023-03-31 PROCEDURE — 83735 ASSAY OF MAGNESIUM: CPT

## 2023-03-31 PROCEDURE — 87636 SARSCOV2 & INF A&B AMP PRB: CPT

## 2023-03-31 PROCEDURE — 6370000000 HC RX 637 (ALT 250 FOR IP): Performed by: PSYCHIATRY & NEUROLOGY

## 2023-03-31 PROCEDURE — 80179 DRUG ASSAY SALICYLATE: CPT

## 2023-03-31 PROCEDURE — 82077 ASSAY SPEC XCP UR&BREATH IA: CPT

## 2023-03-31 PROCEDURE — 99285 EMERGENCY DEPT VISIT HI MDM: CPT

## 2023-03-31 PROCEDURE — 82962 GLUCOSE BLOOD TEST: CPT

## 2023-03-31 PROCEDURE — 84443 ASSAY THYROID STIM HORMONE: CPT

## 2023-03-31 PROCEDURE — 1240000000 HC EMOTIONAL WELLNESS R&B

## 2023-03-31 PROCEDURE — 80053 COMPREHEN METABOLIC PANEL: CPT

## 2023-03-31 PROCEDURE — 80307 DRUG TEST PRSMV CHEM ANLYZR: CPT

## 2023-03-31 PROCEDURE — 85025 COMPLETE CBC W/AUTO DIFF WBC: CPT

## 2023-03-31 RX ORDER — ACETAMINOPHEN 325 MG/1
650 TABLET ORAL EVERY 4 HOURS PRN
Status: DISCONTINUED | OUTPATIENT
Start: 2023-03-31 | End: 2023-04-01 | Stop reason: HOSPADM

## 2023-03-31 RX ORDER — BENZTROPINE MESYLATE 1 MG/1
1 TABLET ORAL EVERY 6 HOURS PRN
Status: DISCONTINUED | OUTPATIENT
Start: 2023-03-31 | End: 2023-04-01 | Stop reason: HOSPADM

## 2023-03-31 RX ORDER — HYDROXYZINE PAMOATE 50 MG/1
50 CAPSULE ORAL EVERY 6 HOURS PRN
Status: DISCONTINUED | OUTPATIENT
Start: 2023-03-31 | End: 2023-04-01 | Stop reason: HOSPADM

## 2023-03-31 RX ORDER — HALOPERIDOL 5 MG/ML
5 INJECTION INTRAMUSCULAR EVERY 6 HOURS PRN
Status: DISCONTINUED | OUTPATIENT
Start: 2023-03-31 | End: 2023-04-01

## 2023-03-31 RX ORDER — ATORVASTATIN CALCIUM 20 MG/1
20 TABLET, FILM COATED ORAL DAILY
Status: DISCONTINUED | OUTPATIENT
Start: 2023-04-01 | End: 2023-04-01 | Stop reason: HOSPADM

## 2023-03-31 RX ORDER — SENNA PLUS 8.6 MG/1
1 TABLET ORAL DAILY PRN
Status: DISCONTINUED | OUTPATIENT
Start: 2023-03-31 | End: 2023-04-01 | Stop reason: HOSPADM

## 2023-03-31 RX ORDER — HALOPERIDOL 5 MG/1
5 TABLET ORAL EVERY 6 HOURS PRN
Status: DISCONTINUED | OUTPATIENT
Start: 2023-03-31 | End: 2023-04-01

## 2023-03-31 RX ORDER — PERPHENAZINE 2 MG/1
2 TABLET ORAL 2 TIMES DAILY WITH MEALS
Status: DISCONTINUED | OUTPATIENT
Start: 2023-03-31 | End: 2023-04-01

## 2023-03-31 RX ADMIN — HALOPERIDOL 5 MG: 5 TABLET ORAL at 20:23

## 2023-03-31 RX ADMIN — PERPHENAZINE 2 MG: 2 TABLET, FILM COATED ORAL at 18:45

## 2023-03-31 ASSESSMENT — PAIN - FUNCTIONAL ASSESSMENT
PAIN_FUNCTIONAL_ASSESSMENT: NONE - DENIES PAIN
PAIN_FUNCTIONAL_ASSESSMENT: NONE - DENIES PAIN

## 2023-03-31 ASSESSMENT — SLEEP AND FATIGUE QUESTIONNAIRES
DO YOU HAVE DIFFICULTY SLEEPING: YES
DO YOU USE A SLEEP AID: YES
SLEEP PATTERN: DIFFICULTY FALLING ASLEEP
AVERAGE NUMBER OF SLEEP HOURS: 6

## 2023-03-31 ASSESSMENT — LIFESTYLE VARIABLES
HOW OFTEN DO YOU HAVE A DRINK CONTAINING ALCOHOL: NEVER
HOW MANY STANDARD DRINKS CONTAINING ALCOHOL DO YOU HAVE ON A TYPICAL DAY: PATIENT DOES NOT DRINK

## 2023-03-31 NOTE — BSMART NOTE
Behavioral Health Crisis Assessment    Chief Complaint:   Chief Complaint   Patient presents with    Suicidal        BSMART Consult requested by Jenna Fuentes for Kenyetta Perez. Patient arrived to [facility] ED due to suicidal ideation, depression, and auditory hallucinations. Mental Status Exam: Kenyetta Perez is a 39 y/o Male. Patient presented as Cooperative, A & O x3, and appears Appears stated age, Well-kept, Cooperative, and Maintains eye-contact. Pt displayed Anxious mood, with Congruent affect. Pt's speech was Pressured and Soft. Thought content was Self-injurious thoughts and Suicidal ideation and thought process was Linear with Intact long-term and Intact short-term memory. Pt's insight was Good and judgement was Stable. Assessment: Pt endorses SI due to command AH. Pt denied HI/VH/lacked evidence of delusions. Pt stated he has been feeling suicidal for the past few weeks even though he is compliant with his meds/appts with the [address]. He could not identify a trigger but reports his auditory hallucinations have been getting stronger, they started as intrusive thoughts and are now command telling him he needs to harm or kill himself. He denies having a plan but thinks that he would cut himself as that is how he has attempted before. Pt unable to contract for safety at time of assessment. C-SSRS: severe    Psychiatric History: Pt does have hx of suicidal ideations and reports previous attempts by cutting. Substance Use History: Pt denies substance use. Treatment History: Pt has 9+ inpatient admissions at various [facility] facilities as well as  Psych. Psychosocial History: Patient reports living at home with alone. Patient is  currently unemployed .     History of Trauma/Abuse: Denies    Protective Factors: adequate family/social support    Risk Factors: Hallucinations and Hx of self-injury    Legal History/Violence towards Others: No    Access to weapons: 0 - 5 %    Basophils 1 0 - 2 %    Immature Granulocytes 0 0.0 - 0.5 %    Segs Absolute 3.8 1.8 - 8.0 K/UL    Absolute Lymph # 2.2 0.9 - 3.6 K/UL    Absolute Mono # 0.5 0.05 - 1.2 K/UL    Absolute Eos # 0.1 0.0 - 0.4 K/UL    Basophils Absolute 0.1 0.0 - 0.1 K/UL    Absolute Immature Granulocyte 0.0 0.00 - 0.04 K/UL    Differential Type AUTOMATED     CMP    Collection Time: 03/31/23  5:45 AM   Result Value Ref Range    Sodium 137 136 - 145 mmol/L    Potassium 4.1 3.5 - 5.5 mmol/L    Chloride 105 100 - 111 mmol/L    CO2 28 21 - 32 mmol/L    Anion Gap 4 3.0 - 18 mmol/L    Glucose 87 74 - 99 mg/dL    BUN 14 7.0 - 18 MG/DL    Creatinine 1.15 0.6 - 1.3 MG/DL    Bun/Cre Ratio 12 12 - 20      Est, Glom Filt Rate >60 >60 ml/min/1.73m2    Calcium 9.6 8.5 - 10.1 MG/DL    Total Bilirubin 0.4 0.2 - 1.0 MG/DL    ALT 31 16 - 61 U/L    AST 31 10 - 38 U/L    Alk Phosphatase 83 45 - 117 U/L    Total Protein 8.0 6.4 - 8.2 g/dL    Albumin 3.8 3.4 - 5.0 g/dL    Globulin 4.2 (H) 2.0 - 4.0 g/dL    Albumin/Globulin Ratio 0.9 0.8 - 1.7     Salicylate    Collection Time: 03/31/23  5:45 AM   Result Value Ref Range    Salicylate, Serum 3.5 2.8 - 20.0 MG/DL   ETOH    Collection Time: 03/31/23  5:45 AM   Result Value Ref Range    Ethanol Lvl <3 0 - 3 MG/DL   Magnesium    Collection Time: 03/31/23  5:45 AM   Result Value Ref Range    Magnesium 2.2 1.6 - 2.6 mg/dL   TSH    Collection Time: 03/31/23  5:45 AM   Result Value Ref Range    TSH, 3RD GENERATION 1.80 0.36 - 3.74 uIU/mL   Acetaminophen Level    Collection Time: 03/31/23  5:45 AM   Result Value Ref Range    Acetaminophen Level <2 (L) 10.0 - 30.0 ug/mL         Internal Administration   First Dose      Second Dose           Last COVID Lab SARS-CoV-2, PCR ( )   Date Value   03/31/2023 Not detected

## 2023-03-31 NOTE — PROGRESS NOTES
Nurse will initiate, develop, implement, review and revise treatment plan as needed, patient took medication as ordered, resting quietly in room.

## 2023-03-31 NOTE — H&P
[facility] Family Medicine  FAMILY MEDICINE CONSULT NOTE FOR BEHAVIORAL HEALTH UNIT    Patient:    Srinath Salcedo , 38 y.o. male   Room/Bed:  126/02  Admission Date:   3/31/2023  Code status:  Full Code      Reason for Consult: H&P  Psychiatry Attending Requesting Consult: Alexis Rosado    ASSESSMENT:  Srinath Salcedo is a 38 y.o. male w/ a PMH of HTN, DM2 and Schizoaffective disorder presenting for suicidal ideation who is now admitted to the [place] [address] Unit. Nurse Chaperone during History and Physical:     PLAN:    Suicidal Ideation, Depression, Auditory Hallucinations  Schizoaffective Disorder, Depressive type  -Current Scheduled Rx: Perphenaine 2 mg BID  -Current PRN Rx: Haloperidol 5 mg, Hydroxyzine 50 mg, Benztropine 1 mg  -Management per inpatient psychiatry    Type 2 Diabetes Mellitus  -Current OP Rx: Glipizide, per patient + Lipitor  -Patient unsure of his doses but per Epic records appears to be Glipizide 2.5 mg BID and Lipitor 20 mg   -Reports that his pharmacy is [facility] in [place] ([place] [address])  -Recommend that we wait to see patient's Hemoglobin A1C results before restarting glipizide  -Consider alternative medication than glipizide given it's hypoglycemia SE potential  -Continue Lipitor 20 mg    HTN  BP controlled in the ED with systolic in [id number] and diastolic 24D  -Current OP Rx: Losartan, per patient  -Patient unsure of his dose but per Epic records appears to be Losartan 100 mg daily  -Although patient reports he took his last dose of medications yesterday, recommend monitoring his blood pressures and adding back medications at lower dose if needed for goal of <140/90    Angular Cheilitis  Without evidence of Impetigo at this time  Reports has a history of cold sores, no active vesicularlesions     Current Cigarette Smoker    Alcohol use: reports drinks hard liquor as well as beer, typically a 12 pack a night.  Has had Dts in the past. Denies any agitation or anxiety

## 2023-03-31 NOTE — BSMART NOTE
Crisis Note: Crisis gave report to Mary Peralta Patient will go into room 126-2 on the Adult unit. Discussed with DANNI Smart about bed assignment. ED-RN Ivon Cole made aware to give report.

## 2023-03-31 NOTE — ED NOTES
Refuses to answer questions and sts,\"I don't want to talk about it\" refused care     Adalberto Frost, RN  03/31/23 0024

## 2023-03-31 NOTE — ED NOTES
TRANSFER - OUT REPORT:    Verbal report given to Jose Castro RN on Leonides Stage  being transferred to [facility] for routine progression of patient care       Report consisted of patient's Situation, Background, Assessment and   Recommendations(SBAR). Information from the following report(s) Nurse Handoff Report was reviewed with the receiving nurse. Edmond Assessment: Presents to emergency department  because of falls (Syncope, seizure, or loss of consciousness): No, Age > 79: No, Altered Mental Status, Intoxication with alcohol or substance confusion (Disorientation, impaired judgment, poor safety awaremess, or inability to follow instructions): No, Impaired Mobility: Ambulates or transfers with assistive devices or assistance; Unable to ambulate or transer.: No, Nursing Judgement: Yes  Lines:       Opportunity for questions and clarification was provided.       Patient transported with:  Reva Law RN  03/31/23 0388

## 2023-03-31 NOTE — ED NOTES
Pt reports suicidal ideations without a plan along with hearing voices     Kyrie Frank RN  03/31/23 2046

## 2023-03-31 NOTE — ED NOTES
Pt's parent, Frankie Langley, updated on pt's POC and status.      Edmund Carrillo RN  03/31/23 5376

## 2023-03-31 NOTE — ED NOTES
PT presents with SI without a plan. Endorses auditory hallucinations but will not elaborate further. Calm and cooperative in triage.        Pura Gonzalez RN  03/31/23 4459

## 2023-03-31 NOTE — ED NOTES
Pt. Was changed into blue scrubs and given a warm blanket/ pt is lying down resting     LeafstickK  03/31/23 8321

## 2023-03-31 NOTE — ED NOTES
Pt c/o abd pain- pt does not know how long it has been going on and states he vomited \"a little bit. \"  PT would not elaborate further on complaint.   Two sticks in triage- unable to obtain labs       Riana Johansen RN  03/31/23 9822

## 2023-03-31 NOTE — ED PROVIDER NOTES
Emergency Department Encounter  [address] [facility] EMERGENCY DEPT    Patient: Gaby Duffy  MRN: [id number]  : 1980  Date of Evaluation: 3/31/2023  ED Provider: Clemencia Falcon MD    Chief Complaint       Chief Complaint   Patient presents with    Suicidal     Jassidakotah Relily is a 38 y.o. male who presents to the emergency department with report that he is feeling suicidal.  He does not have a plan. He denies homicidal ideations. The patient is a hears voices that are telling him to hurt himself. He stated he is previously tried to hurt himself a long time ago when he drank cleanser. ROS:     At least 10 systems reviewed and otherwise acutely negative except as in the [address] Past History     Past Medical History:   Diagnosis Date    Crossed renal ectopia     sees nephrology    Diabetes mellitus ([facility] [address]     GERD (gastroesophageal reflux disease)     Hematuria     History of DVT (deep vein thrombosis) 2007    Hypertension     PTSD (post-traumatic stress disorder)     Sarcoidosis     Schizophrenia, paranoid type ([facility] [address]      History reviewed. No pertinent surgical history. Social History     Socioeconomic History    Marital status:      Spouse name: None    Number of children: None    Years of education: None    Highest education level: None   Tobacco Use    Smoking status: Former     Packs/day: 0.10     Types: Cigarettes    Smokeless tobacco: Never    Tobacco comments:     Quit smoking: Black and Waqar - 4   Substance and Sexual Activity    Alcohol use:  Yes     Alcohol/week: 2.5 standard drinks    Drug use: Yes     Frequency: 3.0 times per week     Types: Marijuana Valdene Triplett)       Medications/Allergies     Previous Medications    ATORVASTATIN (LIPITOR) 20 MG TABLET    take 1 tablet by mouth once daily    BENZTROPINE (COGENTIN) 2 MG TABLET    Take 4 mg by mouth 2 times daily    CARBONYL IRON (FEOSOL) 45 MG TABS    Take 45 mg by mouth every other day    DIVALPROEX (DEPAKOTE) 500 MG DR TABLET    1 tab

## 2023-03-31 NOTE — PLAN OF CARE
Problem: Self Harm/Suicidality  Goal: Will have no self-injury during hospital stay  Description: INTERVENTIONS:  1. Ensure constant observer at bedside with Q15M safety checks  2. Maintain a safe environment  3. Secure patient belongings  4. Ensure family/visitors adhere to safety recommendations  5. Ensure safety tray has been added to patient's diet order  6. Every shift and PRN: Re-assess suicidal risk via Frequent Screener    Outcome: Not Progressing     Problem: Self Harm/Suicidality  Goal: Will have no self-injury during hospital stay  Description: INTERVENTIONS:  1. Ensure constant observer at bedside with Q15M safety checks  2. Maintain a safe environment  3. Secure patient belongings  4. Ensure family/visitors adhere to safety recommendations  5. Ensure safety tray has been added to patient's diet order  6. Every shift and PRN: Re-assess suicidal risk via Frequent Screener    Outcome: Not Progressing     Problem: Depression  Goal: Will be euthymic at discharge  Description: INTERVENTIONS:  1. Administer medication as ordered  2. Provide emotional support via 1:1 interaction with staff  3. Encourage involvement in milieu/groups/activities  4.  Monitor for social isolation  Outcome: Not Progressing

## 2023-03-31 NOTE — BSMART NOTE
BSMART Note: Responded to 's [facility] consult for SI/hallucinations. Crisis counselor to assess as soon as possible.

## 2023-03-31 NOTE — PROGRESS NOTES
Patient received on unit at [phone] via wheelchair, escorted by security, belongings checked by two staff members, pat down search also completed by male MHT, no contraband found on person, no valuables noted in belongings orders released, behavior health assessment completed, patient is alert and oriented x 4, ambulates independently, [address] notified of patient's admission, will have someone come to consult for H&P later this evening, patient in room  in bed resting.

## 2023-04-01 VITALS
SYSTOLIC BLOOD PRESSURE: 145 MMHG | BODY MASS INDEX: 25.49 KG/M2 | WEIGHT: 205 LBS | OXYGEN SATURATION: 92 % | TEMPERATURE: 98.3 F | DIASTOLIC BLOOD PRESSURE: 102 MMHG | HEIGHT: 75 IN | RESPIRATION RATE: 18 BRPM | HEART RATE: 103 BPM

## 2023-04-01 LAB
EKG ATRIAL RATE: 76 BPM
EKG DIAGNOSIS: NORMAL
EKG P AXIS: 68 DEGREES
EKG P-R INTERVAL: 142 MS
EKG Q-T INTERVAL: 364 MS
EKG QRS DURATION: 106 MS
EKG QTC CALCULATION (BAZETT): 409 MS
EKG R AXIS: 54 DEGREES
EKG T AXIS: 28 DEGREES
EKG VENTRICULAR RATE: 76 BPM

## 2023-04-01 PROCEDURE — 93005 ELECTROCARDIOGRAM TRACING: CPT | Performed by: PSYCHIATRY & NEUROLOGY

## 2023-04-01 PROCEDURE — 99222 1ST HOSP IP/OBS MODERATE 55: CPT | Performed by: PSYCHIATRY & NEUROLOGY

## 2023-04-01 PROCEDURE — 93010 ELECTROCARDIOGRAM REPORT: CPT | Performed by: INTERNAL MEDICINE

## 2023-04-01 PROCEDURE — 6370000000 HC RX 637 (ALT 250 FOR IP): Performed by: STUDENT IN AN ORGANIZED HEALTH CARE EDUCATION/TRAINING PROGRAM

## 2023-04-01 PROCEDURE — 6370000000 HC RX 637 (ALT 250 FOR IP): Performed by: PSYCHIATRY & NEUROLOGY

## 2023-04-01 RX ORDER — HALOPERIDOL 5 MG/1
5 TABLET ORAL EVERY 6 HOURS PRN
Status: DISCONTINUED | OUTPATIENT
Start: 2023-04-01 | End: 2023-04-01 | Stop reason: HOSPADM

## 2023-04-01 RX ORDER — HALOPERIDOL 5 MG/1
5 TABLET ORAL
Status: DISCONTINUED | OUTPATIENT
Start: 2023-04-01 | End: 2023-04-01 | Stop reason: HOSPADM

## 2023-04-01 RX ORDER — HALOPERIDOL 5 MG/ML
5 INJECTION INTRAMUSCULAR EVERY 6 HOURS PRN
Status: DISCONTINUED | OUTPATIENT
Start: 2023-04-01 | End: 2023-04-01 | Stop reason: HOSPADM

## 2023-04-01 RX ORDER — LOSARTAN POTASSIUM 50 MG/1
100 TABLET ORAL DAILY
Status: DISCONTINUED | OUTPATIENT
Start: 2023-04-01 | End: 2023-04-01 | Stop reason: HOSPADM

## 2023-04-01 RX ORDER — GLIPIZIDE 5 MG/1
2.5 TABLET ORAL
Status: DISCONTINUED | OUTPATIENT
Start: 2023-04-01 | End: 2023-04-01 | Stop reason: HOSPADM

## 2023-04-01 RX ORDER — PETROLATUM, YELLOW 100 %
JELLY (GRAM) MISCELLANEOUS PRN
Status: DISCONTINUED | OUTPATIENT
Start: 2023-04-01 | End: 2023-04-01 | Stop reason: HOSPADM

## 2023-04-01 RX ADMIN — PERPHENAZINE 2 MG: 2 TABLET, FILM COATED ORAL at 08:21

## 2023-04-01 RX ADMIN — LOSARTAN POTASSIUM 100 MG: 50 TABLET, FILM COATED ORAL at 13:26

## 2023-04-01 RX ADMIN — ATORVASTATIN CALCIUM 20 MG: 20 TABLET, FILM COATED ORAL at 08:20

## 2023-04-01 NOTE — GROUP NOTE
Group Therapy Note    Date: 3/31/2023    Group Start Time: 2000  Group End Time: 2030  Group Topic: Medication    [facility] 1 ADULT CHEM DEP    Zoey Ruiz RN        Group Therapy Note    Attendees: 3       Patient's Goal:  medication      Status After Intervention:  Unchanged    Participation Level:  Active Listener    Participation Quality: Appropriate      Speech:  normal      Thought Process/Content: Logical      Affective Functioning: Flat      Mood: euthymic      Level of consciousness:  Alert      Response to Learning: Able to verbalize current knowledge/experience      Endings: None Reported    Modes of Intervention: Education and Support      Discipline Responsible: Registered Nurse      Signature:  Mariano Licona RN

## 2023-04-01 NOTE — BSMART NOTE
Art Therapy Group Progress Note     PATIENT SCHEDULED FOR GROUP AT:    12:30 PM    GROUP STOP TIME:  1:05 PM    ATTENDANCE:  2/10 participants    PARTICIPATION LEVEL: Declined     ATTENTION LEVEL: N/A    TOPIC / FOCUS: Small Art Making     GOALS:  promote relaxation, increase creativity, increase autonomy     Dayana Vale MA   Art Therapist

## 2023-04-01 NOTE — PLAN OF CARE
Problem: Safety - Adult  Goal: Free from fall injury  Outcome: Progressing     Problem: Self Harm/Suicidality  Goal: Will have no self-injury during hospital stay  Description: INTERVENTIONS:  1. Ensure constant observer at bedside with Q15M safety checks  2. Maintain a safe environment  3. Secure patient belongings  4. Ensure family/visitors adhere to safety recommendations  5. Ensure safety tray has been added to patient's diet order  6. Every shift and PRN: Re-assess suicidal risk via Frequent Screener    3/31/2023 2227 by Jesse Sainz RN  Outcome: Progressing  3/31/2023 1703 by Tatum Angulo RN  Outcome: Not Progressing     Problem: Depression  Goal: Will be euthymic at discharge  Description: INTERVENTIONS:  1. Administer medication as ordered  2. Provide emotional support via 1:1 interaction with staff  3. Encourage involvement in milieu/groups/activities  4. Monitor for social isolation  3/31/2023 2227 by Jesse Sainz RN  Outcome: Progressing  3/31/2023 1703 by Tatum Angulo RN  Outcome: Not Progressing     Problem: Anxiety  Goal: Will report anxiety at manageable levels  Description: INTERVENTIONS:  1. Administer medication as ordered  2. Teach and rehearse alternative coping skills  3. Provide emotional support with 1:1 interaction with staff  Outcome: Progressing     Problem: Sleep Disturbance  Goal: Will exhibit normal sleeping pattern  Description: INTERVENTIONS:  1. Administer medication as ordered  2. Decrease environmental stimuli, including noise, as appropriate  3. Discourage social isolation and naps during the day  Outcome: Progressing     Problem: Self Harm/Suicidality  Goal: Will have no self-injury during hospital stay  Description: INTERVENTIONS:  1. Ensure constant observer at bedside with Q15M safety checks  2. Maintain a safe environment  3. Secure patient belongings  4. Ensure family/visitors adhere to safety recommendations  5.   Ensure safety tray has been added to patient's diet order  6. Every shift and PRN: Re-assess suicidal risk via Frequent Screener  PT VISIBLE ON UNIT, ISOLATIVE TO SELF. ADHERES TO UNIT GUIDELINES; MEDICATION COMPLIANT. CIWA SCORE #0, DENIES PAIN OR WITHDRAWAL SYMPTOMS. 3/31/2023 2227 by Iris Sykes RN  Outcome: Progressing  3/31/2023 1703 by Clarise Leyden, RN  Outcome: Not Progressing     Problem: Depression  Goal: Will be euthymic at discharge  Description: INTERVENTIONS:  1. Administer medication as ordered  2. Provide emotional support via 1:1 interaction with staff  3. Encourage involvement in milieu/groups/activities  4.  Monitor for social isolation  3/31/2023 2227 by Iris Sykes RN  Outcome: Progressing  3/31/2023 1703 by Clarise Leyden, RN  Outcome: Not Progressing

## 2023-04-01 NOTE — H&P
99%.  He is 6 feet 3 inches, 205 pounds. BMI is 25 or so. In the physical exam was described as basically negative with the exception of his psychiatric examination characterized by a depressed mood and affect. Suicidal ideation without a plan, auditory hallucinations, denying homicidal ideations. Multiple labs were performed in the emergency room including a CBC with differential showing completely normal test results. A complete metabolic panel show sodium 137, potassium 4.1, chloride of 105, BUN 14, creatinine 1.15, blood sugar 87, normal liver function tests noted. Salicylate levels are normal at 3.5. Alcohol levels below three. Urine drug screen negative. Normal TSH of 1.80 noted. Acetaminophen below 2 mcg/mL. ALCOHOL AND DRUG HISTORY:  Difficult to obtain information from the patient himself. He described drinking regularly, however, unable to know how much. He drank a couple of weeks prior to this current admission he said. No evidence of alcohol-related withdrawal symptoms at the time in which I examined the patient today. If he has withdrawal symptoms, he showed in the form of tremor. He denies seizure activity, denied a history of falls while withdrawing and worsening of his psychotic symptoms also. PERSONAL HISTORY AND FAMILY HISTORY:  Suspicious and not wanting to provide much information. The patient only said that he has several siblings. He stays with his mother by themselves. Relationship with mother is rather supportive he said of each other. The patient described having two children, ages 34 and so he was around 11 when he had his first son and 2 months or so ago he found out that he has another child who is 6. He apparently had a one night stand with a woman who got pregnant and had his son. Apparently, he found out of his child since his brother had been contacted having ancestry results similar with his child. So the child's mother contacted the brother.   He the past, he had problems with metformin he said, and so we decided that will not be continued. We will however, perhaps start at a lower dose. His Accu-Cheks to be done on a regular basis. 4.  The patient is still with Depakote 500 mg in the morning and 1000 mg at bedtime. It is not clear when his last Depakote dose was, however, the patient will have an inpatient  assigned to his care. ESTIMATED LENGTH OF STAY AND PROGNOSIS:  ELOS is 5 to 7 days. Prognosis will depend upon treatment response and treatment compliance. Marianne Gomez MD, LFAPA      [id number]  D:  04/01/2023 10:53  T:  04/01/2023 12:36  JOB #:  [id number]    Behavioral Services  Medicare Certification Upon Admission    I certify that this patient's inpatient psychiatric hospital admission is medically necessary for:      [x] Treatment which could reasonably be expected to improve this patient's condition,       [] For diagnostic study;     AND     [x] The inpatient psychiatric services are provided while the individual is under the care of a physician and are included in the individualized plan of care. Estimated length of stay/service short stay. See DS. Plan for post-hospital care will include psychiatric and medical follow up.     Electronically signed by Marianne Gomez MD on 4/1/2023 at 4:20 PM

## 2023-04-01 NOTE — PLAN OF CARE
Problem: Safety - Adult  Goal: Free from fall injury  Outcome: Progressing     Problem: Self Harm/Suicidality  Goal: Will have no self-injury during hospital stay  Description: INTERVENTIONS:  1. Ensure constant observer at bedside with Q15M safety checks  2. Maintain a safe environment  3. Secure patient belongings  4. Ensure family/visitors adhere to safety recommendations  5. Ensure safety tray has been added to patient's diet order  6. Every shift and PRN: Re-assess suicidal risk via Frequent Screener    Outcome: Progressing     Problem: Depression  Goal: Will be euthymic at discharge  Description: INTERVENTIONS:  1. Administer medication as ordered  2. Provide emotional support via 1:1 interaction with staff  3. Encourage involvement in milieu/groups/activities  4. Monitor for social isolation  Outcome: Progressing    Assumed care of pt at 0730. Pt has been pleasant, calm and cooperative. Pt denies SI/HI/SH thoughts at this time and has contracted for safety. Pt denies all hallucinations at this time. Pt is medication compliant, no prn medications given on shift. Pt is in possession of all of his belongings at time of DC. Pt was walked down to the front of the hospital by [facility] staff.

## 2023-04-01 NOTE — PROGRESS NOTES
Pt presents to nurse's station wanting to be dc'd as he report his mother had a slight MI. Pt denies SI/HI/SH thoughts at this time. Pt denies ALL hallucinations.

## 2023-06-21 ENCOUNTER — APPOINTMENT (OUTPATIENT)
Age: 43
End: 2023-06-21
Payer: MEDICAID

## 2023-06-21 ENCOUNTER — HOSPITAL ENCOUNTER (EMERGENCY)
Age: 43
Discharge: LEFT BEFORE DISCHARGE | End: 2023-06-21
Attending: EMERGENCY MEDICINE
Payer: MEDICAID

## 2023-06-21 VITALS
RESPIRATION RATE: 16 BRPM | DIASTOLIC BLOOD PRESSURE: 88 MMHG | HEART RATE: 94 BPM | HEIGHT: 75 IN | BODY MASS INDEX: 28.78 KG/M2 | SYSTOLIC BLOOD PRESSURE: 132 MMHG | WEIGHT: 231.5 LBS | TEMPERATURE: 98.3 F | OXYGEN SATURATION: 98 %

## 2023-06-21 DIAGNOSIS — R10.9 ABDOMINAL PAIN, UNSPECIFIED ABDOMINAL LOCATION: Primary | ICD-10-CM

## 2023-06-21 LAB
ALBUMIN SERPL-MCNC: 4 G/DL (ref 3.4–5)
ALBUMIN/GLOB SERPL: 0.9 (ref 0.8–1.7)
ALP SERPL-CCNC: 79 U/L (ref 45–117)
ALT SERPL-CCNC: 25 U/L (ref 16–61)
ANION GAP SERPL CALC-SCNC: 6 MMOL/L (ref 3–18)
APPEARANCE UR: CLEAR
AST SERPL W P-5'-P-CCNC: 25 U/L (ref 10–38)
BASOPHILS # BLD: 0.1 K/UL (ref 0–0.1)
BASOPHILS NFR BLD: 1 % (ref 0–2)
BILIRUB DIRECT SERPL-MCNC: 0.1 MG/DL (ref 0–0.2)
BILIRUB SERPL-MCNC: 0.3 MG/DL (ref 0.2–1)
BILIRUB UR QL: NEGATIVE
BUN SERPL-MCNC: 10 MG/DL (ref 7–18)
BUN/CREAT SERPL: 9 (ref 12–20)
CA-I BLD-MCNC: 9.3 MG/DL (ref 8.5–10.1)
CHLORIDE SERPL-SCNC: 106 MMOL/L (ref 100–111)
CO2 SERPL-SCNC: 28 MMOL/L (ref 21–32)
COLOR UR: YELLOW
CREAT SERPL-MCNC: 1.13 MG/DL (ref 0.6–1.3)
DIFFERENTIAL METHOD BLD: ABNORMAL
EOSINOPHIL # BLD: 0.1 K/UL (ref 0–0.4)
EOSINOPHIL NFR BLD: 2 % (ref 0–5)
ERYTHROCYTE [DISTWIDTH] IN BLOOD BY AUTOMATED COUNT: 14.8 % (ref 11.6–14.5)
GLOBULIN SER CALC-MCNC: 4.4 G/DL (ref 2–4)
GLUCOSE SERPL-MCNC: 78 MG/DL (ref 74–99)
GLUCOSE UR STRIP.AUTO-MCNC: NEGATIVE MG/DL
HCT VFR BLD AUTO: 48.7 % (ref 36–48)
HGB BLD-MCNC: 16 G/DL (ref 13–16)
HGB UR QL STRIP: NEGATIVE
IMM GRANULOCYTES # BLD AUTO: 0 K/UL (ref 0–0.04)
IMM GRANULOCYTES NFR BLD AUTO: 0 % (ref 0–0.5)
KETONES UR QL STRIP.AUTO: NEGATIVE MG/DL
LEUKOCYTE ESTERASE UR QL STRIP.AUTO: NEGATIVE
LIPASE SERPL-CCNC: 134 U/L (ref 73–393)
LYMPHOCYTES # BLD: 2.1 K/UL (ref 0.9–3.6)
LYMPHOCYTES NFR BLD: 33 % (ref 21–52)
MCH RBC QN AUTO: 27.4 PG (ref 24–34)
MCHC RBC AUTO-ENTMCNC: 32.9 G/DL (ref 31–37)
MCV RBC AUTO: 83.5 FL (ref 78–100)
MONOCYTES # BLD: 0.5 K/UL (ref 0.05–1.2)
MONOCYTES NFR BLD: 8 % (ref 3–10)
NEUTS SEG # BLD: 3.5 K/UL (ref 1.8–8)
NEUTS SEG NFR BLD: 56 % (ref 40–73)
NITRITE UR QL STRIP.AUTO: NEGATIVE
NRBC # BLD: 0 K/UL (ref 0–0.01)
NRBC BLD-RTO: 0 PER 100 WBC
PH UR STRIP: 6.5 (ref 5–8)
PLATELET # BLD AUTO: 190 K/UL (ref 135–420)
PMV BLD AUTO: 9.9 FL (ref 9.2–11.8)
POTASSIUM SERPL-SCNC: 3.6 MMOL/L (ref 3.5–5.5)
PROT SERPL-MCNC: 8.4 G/DL (ref 6.4–8.2)
PROT UR STRIP-MCNC: NEGATIVE MG/DL
RBC # BLD AUTO: 5.83 M/UL (ref 4.35–5.65)
SODIUM SERPL-SCNC: 140 MMOL/L (ref 136–145)
SP GR UR REFRACTOMETRY: <1.005 (ref 1–1.03)
UROBILINOGEN UR QL STRIP.AUTO: 0.2 EU/DL (ref 0.2–1)
WBC # BLD AUTO: 6.3 K/UL (ref 4.6–13.2)

## 2023-06-21 PROCEDURE — 83690 ASSAY OF LIPASE: CPT

## 2023-06-21 PROCEDURE — 99284 EMERGENCY DEPT VISIT MOD MDM: CPT

## 2023-06-21 PROCEDURE — 2580000003 HC RX 258: Performed by: EMERGENCY MEDICINE

## 2023-06-21 PROCEDURE — 80076 HEPATIC FUNCTION PANEL: CPT

## 2023-06-21 PROCEDURE — 80048 BASIC METABOLIC PNL TOTAL CA: CPT

## 2023-06-21 PROCEDURE — 6360000002 HC RX W HCPCS: Performed by: EMERGENCY MEDICINE

## 2023-06-21 PROCEDURE — 85025 COMPLETE CBC W/AUTO DIFF WBC: CPT

## 2023-06-21 PROCEDURE — 96374 THER/PROPH/DIAG INJ IV PUSH: CPT

## 2023-06-21 PROCEDURE — 36415 COLL VENOUS BLD VENIPUNCTURE: CPT

## 2023-06-21 PROCEDURE — 81003 URINALYSIS AUTO W/O SCOPE: CPT

## 2023-06-21 PROCEDURE — 96375 TX/PRO/DX INJ NEW DRUG ADDON: CPT

## 2023-06-21 RX ORDER — MORPHINE SULFATE 4 MG/ML
4 INJECTION, SOLUTION INTRAMUSCULAR; INTRAVENOUS
Status: COMPLETED | OUTPATIENT
Start: 2023-06-21 | End: 2023-06-21

## 2023-06-21 RX ORDER — ONDANSETRON 2 MG/ML
4 INJECTION INTRAMUSCULAR; INTRAVENOUS
Status: COMPLETED | OUTPATIENT
Start: 2023-06-21 | End: 2023-06-21

## 2023-06-21 RX ORDER — 0.9 % SODIUM CHLORIDE 0.9 %
1000 INTRAVENOUS SOLUTION INTRAVENOUS ONCE
Status: COMPLETED | OUTPATIENT
Start: 2023-06-21 | End: 2023-06-21

## 2023-06-21 RX ADMIN — MORPHINE SULFATE 4 MG: 4 INJECTION, SOLUTION INTRAMUSCULAR; INTRAVENOUS at 22:55

## 2023-06-21 RX ADMIN — SODIUM CHLORIDE 1000 ML: 9 INJECTION, SOLUTION INTRAVENOUS at 22:56

## 2023-06-21 RX ADMIN — ONDANSETRON 4 MG: 2 INJECTION INTRAMUSCULAR; INTRAVENOUS at 22:56

## 2023-06-21 ASSESSMENT — PAIN DESCRIPTION - LOCATION: LOCATION: ABDOMEN

## 2023-06-21 ASSESSMENT — LIFESTYLE VARIABLES
HOW MANY STANDARD DRINKS CONTAINING ALCOHOL DO YOU HAVE ON A TYPICAL DAY: 5 OR 6
HOW OFTEN DO YOU HAVE A DRINK CONTAINING ALCOHOL: 4 OR MORE TIMES A WEEK

## 2023-06-21 ASSESSMENT — PAIN SCALES - GENERAL: PAINLEVEL_OUTOF10: 8

## 2023-06-21 ASSESSMENT — PAIN DESCRIPTION - DESCRIPTORS: DESCRIPTORS: ACHING

## 2023-06-21 ASSESSMENT — PAIN DESCRIPTION - ORIENTATION: ORIENTATION: MID;LOWER

## 2023-06-22 NOTE — ED PROVIDER NOTES
[facility] EMERGENCY DEPT  EMERGENCY DEPARTMENT ENCOUNTER      Pt Name: Glenys Favre  MRN: [id number]  [place] 1980  Date of evaluation: 6/21/2023  Provider: Jay Jacobo MD    CHIEF COMPLAINT       Chief Complaint   Patient presents with    Abdominal Pain       HPI:  Glenys Favre is a 38 y.o. male who presents to the emergency department pt c/o mid low abd pain, severe, constant, x 3 days, no h/o same w loose diarrhea and nausea. + freq urination. No scrotal/genital pain. HPI    Nursing Notes were reviewed. REVIEW OF SYSTEMS    (2-9 systems for level 4, 10 or more for level 5)     Review of Systems    Except as noted above the remainder of the review of systems was reviewed and negative. PAST MEDICAL HISTORY     Past Medical History:   Diagnosis Date    Crossed renal ectopia     sees nephrology    Diabetes mellitus ([facility] [address]     GERD (gastroesophageal reflux disease) 2011    Hematuria 2012    History of DVT (deep vein thrombosis) 2007    Hypertension     PTSD (post-traumatic stress disorder)     Sarcoidosis     Schizophrenia, paranoid type ([facility] [address]          SURGICAL HISTORY     History reviewed. No pertinent surgical history.       CURRENT MEDICATIONS       Previous Medications    ATORVASTATIN (LIPITOR) 20 MG TABLET    take 1 tablet by mouth once daily    BENZTROPINE (COGENTIN) 2 MG TABLET    Take 4 mg by mouth 2 times daily    CARBONYL IRON (FEOSOL) 45 MG TABS    Take 45 mg by mouth every other day    DIVALPROEX (DEPAKOTE) 500 MG DR TABLET    1 tab qam and 2 tabs qhs    GLIPIZIDE (GLUCOTROL) 5 MG TABLET    take 1/2 tablet by mouth twice a day    HALOPERIDOL LACTATE (HALDOL) 5 MG/ML INJECTION    Inject into the muscle    LOSARTAN (COZAAR) 100 MG TABLET    take 1 tablet by mouth daily       ALLERGIES     Amlodipine    FAMILY HISTORY       Family History   Problem Relation Age of Onset    Alcohol Abuse Paternal Uncle     Depression Maternal Aunt     Diabetes Maternal Grandmother     Alcohol Abuse Mother

## 2023-06-22 NOTE — ED NOTES
I have reviewed discharge instructions with the patient. The patient verbalized understanding. Patient escorted to waiting room with steady gait and no distress noted.      [address] [facility], [address] [place] [address]  06/21/23 9506

## 2023-06-22 NOTE — ED TRIAGE NOTES
Patient reports lower abdominal pain, nausea, and diarrhea. Denies any urinary symptoms or discharge.